# Patient Record
Sex: FEMALE | Race: BLACK OR AFRICAN AMERICAN | NOT HISPANIC OR LATINO | Employment: OTHER | URBAN - METROPOLITAN AREA
[De-identification: names, ages, dates, MRNs, and addresses within clinical notes are randomized per-mention and may not be internally consistent; named-entity substitution may affect disease eponyms.]

---

## 2017-01-13 RX ORDER — FUROSEMIDE 20 MG/1
TABLET ORAL
Qty: 30 TABLET | Refills: 6 | Status: ON HOLD | OUTPATIENT
Start: 2017-01-13 | End: 2017-03-01 | Stop reason: HOSPADM

## 2017-01-25 ENCOUNTER — OFFICE VISIT (OUTPATIENT)
Dept: PODIATRY | Facility: CLINIC | Age: 82
End: 2017-01-25
Payer: MEDICARE

## 2017-01-25 VITALS — WEIGHT: 154.13 LBS | HEIGHT: 61 IN | BODY MASS INDEX: 29.1 KG/M2

## 2017-01-25 DIAGNOSIS — B35.1 DERMATOPHYTOSIS OF NAIL: Primary | ICD-10-CM

## 2017-01-25 PROCEDURE — 99999 PR PBB SHADOW E&M-EST. PATIENT-LVL III: CPT | Mod: PBBFAC,,, | Performed by: PODIATRIST

## 2017-01-25 PROCEDURE — 17999 UNLISTD PX SKN MUC MEMB SUBQ: CPT | Mod: CSM,,, | Performed by: PODIATRIST

## 2017-01-25 PROCEDURE — 99499 UNLISTED E&M SERVICE: CPT | Mod: CSM,,, | Performed by: PODIATRIST

## 2017-01-25 NOTE — MR AVS SNAPSHOT
[facility] - Podiatry  [address] [facility] [place] [address]  Phone: [phone]  Fax: [phone]                  Radha Morales   2017 9:40 AM   Office Visit    Description:  Female : 1931   Provider:  Lilly Butts DPM   Department:  [facility] - Podiatry           Reason for Visit     Routine Foot Care                To Do List           Future Appointments        Provider Department Dept Phone    2/15/2017 9:20 AM Ashleigh Herrera MD [facility] - Internal Medicine [phone]    2017 9:00 AM Lilly Butts DPM [facility] Podiatry [phone]      Goals (5 Years of Data)     None      [facility]     [facility] -  Assistance  Registered nurses in the [facility] Call Center provide clinical advisement, health education, appointment booking, and other advisory services.  Call for this free service at [phone].             Medications           Message regarding Medications     Verify the changes and/or additions to your medication regime listed below are the same as discussed with your clinician today.  If any of these changes or additions are incorrect, please notify your healthcare provider.             Verify that the below list of medications is an accurate representation of the medications you are currently taking.  If none reported, the list may be blank. If incorrect, please contact your healthcare provider. Carry this list with you in case of emergency.           Current Medications     aspirin (ASPIRIN CHILDRENS) 81 MG Chew Take by mouth. 1 Tablet, Chewable Oral Every day    cholecalciferol, vitamin D3, 2,000 unit Cap Take 1 capsule (2,000 Units total) by mouth once daily.    fluticasone (FLONASE) 50 mcg/actuation nasal spray 1 spray by Each Nare route once daily.    furosemide (LASIX) 20 MG tablet TAKE 1 TABLET BY MOUTH ONCE A DAY    memantine (NAMENDA) 10 MG Tab TAKE ONE TABLET BY MOUTH TWICE DAILY    metoprolol tartrate (LOPRESSOR) 50 MG tablet  "TAKE ONE TABLET BY MOUTH TWICE DAILY    quetiapine (SEROQUEL) 50 MG tablet TAKE 1 OR 2 TABLETS BY MOUTH AT BEDTIME           Clinical Reference Information           Vital Signs - Last Recorded  Most recent update: 1/25/2017 10:30 AM by Teresa Corea MA    Ht Wt BMI          5' 1" (1.549 m) 69.9 kg (154 lb 1.6 oz) 29.12 kg/m2        Allergies as of 1/25/2017     Penicillins    Propoxyphene N-acetaminophen      Immunizations Administered on Date of Encounter - 1/25/2017     None      [facility] Sign-Up     Activating your [facility] account is as easy as 1-2-3!     1) Visit [email or web address], select Sign Up Now, enter this activation code and your date of birth, then select Next.  [email or web address]  Expires: 3/11/2017 10:52 AM      2) Create a username and password to use when you visit [facility] in the future and select a security question in case you lose your password and select Next.    3) Enter your e-mail address and click Sign Up!    Additional Information  If you have questions, please e-mail [email or web address] or call [phone] to talk to our [facility] staff. Remember, [facility] is NOT to be used for urgent needs. For medical emergencies, dial 911.         "

## 2017-01-25 NOTE — PROGRESS NOTES
PODIATRY NOTE    CHIEF COMPLAINT   Non-medical covered nail care    HPI:    Radha Morales is a 85 y.o. female presenting to podiatry clinic with complaint of progressive thickening and discoloration of the nails. The patient relates they are unable to trim their toenails due to thickness and difficulty. Toenails are painful and aggravated in shoewear.    LOWER EXTREMITY  Dermatologic:   · Nails 1-5 bilateral thickened, elongated, dystrophic, with subungual debris      ASSESSMENT   1. Dermatophytosis     PLAN    1. With patient's permission, nails were aggressively reduced and debrided x 10 to their soft tissue attachment mechanically and with electric , removing all offending nail and debris. Patient relates relief following the procedure.   -I advised the patient that for routine nail care or callus coverage is not covered under their insurance therefore we will enroll the patient in PROC B cosmetic foot care. The patient understands that they will be fully responsible for the bill as cosmetic foot care is not a covered service for their insurance.   3. RTC PRN    Report Electronically Signed By:  Lilly Butts DPM   Podiatric Medicine & Surgery  [facility]  1/25/2017

## 2017-02-15 ENCOUNTER — TELEPHONE (OUTPATIENT)
Dept: INTERNAL MEDICINE | Facility: CLINIC | Age: 82
End: 2017-02-15

## 2017-02-15 NOTE — TELEPHONE ENCOUNTER
----- Message from Catalina Hunter sent at 2/15/2017  3:20 PM CST -----  Pt is requesting a call from nurse in regards to r/s her appt. Pt was offered a mid level             Please call pt back at [phone]

## 2017-02-26 ENCOUNTER — HOSPITAL ENCOUNTER (INPATIENT)
Facility: HOSPITAL | Age: 82
LOS: 3 days | Discharge: HOME HEALTH | DRG: 872 | End: 2017-03-01
Attending: EMERGENCY MEDICINE | Admitting: INTERNAL MEDICINE
Payer: MEDICARE

## 2017-02-26 DIAGNOSIS — A41.9 SEPSIS: ICD-10-CM

## 2017-02-26 DIAGNOSIS — M15.9 PRIMARY OSTEOARTHRITIS INVOLVING MULTIPLE JOINTS: ICD-10-CM

## 2017-02-26 DIAGNOSIS — E78.00 PURE HYPERCHOLESTEROLEMIA: ICD-10-CM

## 2017-02-26 DIAGNOSIS — T17.908S ASPIRATION INTO AIRWAY, SEQUELA: ICD-10-CM

## 2017-02-26 DIAGNOSIS — I77.9 BILATERAL CAROTID ARTERY DISEASE: ICD-10-CM

## 2017-02-26 DIAGNOSIS — T17.800A ASPIRATION INTO LOWER RESPIRATORY TRACT, INITIAL ENCOUNTER: ICD-10-CM

## 2017-02-26 DIAGNOSIS — I47.10 SVT (SUPRAVENTRICULAR TACHYCARDIA): ICD-10-CM

## 2017-02-26 DIAGNOSIS — I25.10 CORONARY ARTERY DISEASE DUE TO CALCIFIED CORONARY LESION: ICD-10-CM

## 2017-02-26 DIAGNOSIS — I70.0 CALCIFICATION OF AORTA: ICD-10-CM

## 2017-02-26 DIAGNOSIS — I51.89 LEFT VENTRICULAR DIASTOLIC DYSFUNCTION WITH PRESERVED SYSTOLIC FUNCTION: ICD-10-CM

## 2017-02-26 DIAGNOSIS — G30.1 LATE ONSET ALZHEIMER'S DISEASE WITHOUT BEHAVIORAL DISTURBANCE: ICD-10-CM

## 2017-02-26 DIAGNOSIS — I25.84 CORONARY ARTERY DISEASE DUE TO CALCIFIED CORONARY LESION: ICD-10-CM

## 2017-02-26 DIAGNOSIS — R00.0 TACHYCARDIA: Primary | ICD-10-CM

## 2017-02-26 DIAGNOSIS — R50.9 FEVER: ICD-10-CM

## 2017-02-26 DIAGNOSIS — I10 ESSENTIAL HYPERTENSION: Chronic | ICD-10-CM

## 2017-02-26 DIAGNOSIS — F02.80 LATE ONSET ALZHEIMER'S DISEASE WITHOUT BEHAVIORAL DISTURBANCE: ICD-10-CM

## 2017-02-26 PROBLEM — T17.908A ASPIRATION INTO AIRWAY: Status: ACTIVE | Noted: 2017-02-26

## 2017-02-26 LAB
ALBUMIN SERPL BCP-MCNC: 3.5 G/DL
ALP SERPL-CCNC: 99 U/L
ALT SERPL W/O P-5'-P-CCNC: 23 U/L
ANION GAP SERPL CALC-SCNC: 10 MMOL/L
APTT BLDCRRT: 27.3 SEC
AST SERPL-CCNC: 28 U/L
BACTERIA #/AREA URNS HPF: NORMAL /HPF
BASOPHILS # BLD AUTO: 0.02 K/UL
BASOPHILS NFR BLD: 0.2 %
BILIRUB SERPL-MCNC: 1.3 MG/DL
BILIRUB UR QL STRIP: NEGATIVE
BNP SERPL-MCNC: 169 PG/ML
BUN SERPL-MCNC: 8 MG/DL
CALCIUM SERPL-MCNC: 9.5 MG/DL
CHLORIDE SERPL-SCNC: 99 MMOL/L
CLARITY UR: CLEAR
CO2 SERPL-SCNC: 27 MMOL/L
COLOR UR: YELLOW
CREAT SERPL-MCNC: 0.7 MG/DL
DIFFERENTIAL METHOD: ABNORMAL
EOSINOPHIL # BLD AUTO: 0.1 K/UL
EOSINOPHIL NFR BLD: 0.5 %
ERYTHROCYTE [DISTWIDTH] IN BLOOD BY AUTOMATED COUNT: 13.6 %
EST. GFR  (AFRICAN AMERICAN): >60 ML/MIN/1.73 M^2
EST. GFR  (NON AFRICAN AMERICAN): >60 ML/MIN/1.73 M^2
FLUAV AG SPEC QL IA: NEGATIVE
FLUBV AG SPEC QL IA: NEGATIVE
GLUCOSE SERPL-MCNC: 139 MG/DL
GLUCOSE UR QL STRIP: ABNORMAL
HCT VFR BLD AUTO: 34.9 %
HGB BLD-MCNC: 12 G/DL
HGB UR QL STRIP: ABNORMAL
INR PPP: 1.1
KETONES UR QL STRIP: NEGATIVE
LACTATE SERPL-SCNC: 2 MMOL/L
LEUKOCYTE ESTERASE UR QL STRIP: NEGATIVE
LYMPHOCYTES # BLD AUTO: 1.1 K/UL
LYMPHOCYTES NFR BLD: 10.6 %
MCH RBC QN AUTO: 26.8 PG
MCHC RBC AUTO-ENTMCNC: 34.4 %
MCV RBC AUTO: 78 FL
MICROSCOPIC COMMENT: NORMAL
MONOCYTES # BLD AUTO: 0.9 K/UL
MONOCYTES NFR BLD: 8.8 %
NEUTROPHILS # BLD AUTO: 8.4 K/UL
NEUTROPHILS NFR BLD: 80.1 %
NITRITE UR QL STRIP: NEGATIVE
PH UR STRIP: 7 [PH] (ref 5–8)
PLATELET # BLD AUTO: 131 K/UL
PMV BLD AUTO: 11.5 FL
POTASSIUM SERPL-SCNC: 3 MMOL/L
PROT SERPL-MCNC: 7.9 G/DL
PROT UR QL STRIP: ABNORMAL
PROTHROMBIN TIME: 11.4 SEC
RBC # BLD AUTO: 4.47 M/UL
RBC #/AREA URNS HPF: 4 /HPF (ref 0–4)
SODIUM SERPL-SCNC: 136 MMOL/L
SP GR UR STRIP: 1.01 (ref 1–1.03)
SPECIMEN SOURCE: NORMAL
SQUAMOUS #/AREA URNS HPF: 1 /HPF
URN SPEC COLLECT METH UR: ABNORMAL
UROBILINOGEN UR STRIP-ACNC: 1 EU/DL
WBC # BLD AUTO: 10.45 K/UL
WBC #/AREA URNS HPF: 1 /HPF (ref 0–5)

## 2017-02-26 PROCEDURE — 99285 EMERGENCY DEPT VISIT HI MDM: CPT | Mod: 25

## 2017-02-26 PROCEDURE — 63600175 PHARM REV CODE 636 W HCPCS: Performed by: EMERGENCY MEDICINE

## 2017-02-26 PROCEDURE — P9612 CATHETERIZE FOR URINE SPEC: HCPCS

## 2017-02-26 PROCEDURE — 87040 BLOOD CULTURE FOR BACTERIA: CPT

## 2017-02-26 PROCEDURE — 25500020 PHARM REV CODE 255: Performed by: EMERGENCY MEDICINE

## 2017-02-26 PROCEDURE — 25000003 PHARM REV CODE 250: Performed by: INTERNAL MEDICINE

## 2017-02-26 PROCEDURE — 80053 COMPREHEN METABOLIC PANEL: CPT

## 2017-02-26 PROCEDURE — 85025 COMPLETE CBC W/AUTO DIFF WBC: CPT

## 2017-02-26 PROCEDURE — 21400001 HC TELEMETRY ROOM

## 2017-02-26 PROCEDURE — 93005 ELECTROCARDIOGRAM TRACING: CPT

## 2017-02-26 PROCEDURE — 85610 PROTHROMBIN TIME: CPT

## 2017-02-26 PROCEDURE — 96375 TX/PRO/DX INJ NEW DRUG ADDON: CPT

## 2017-02-26 PROCEDURE — 85730 THROMBOPLASTIN TIME PARTIAL: CPT

## 2017-02-26 PROCEDURE — 83880 ASSAY OF NATRIURETIC PEPTIDE: CPT

## 2017-02-26 PROCEDURE — 83605 ASSAY OF LACTIC ACID: CPT

## 2017-02-26 PROCEDURE — 87400 INFLUENZA A/B EACH AG IA: CPT | Mod: 59

## 2017-02-26 PROCEDURE — 93010 ELECTROCARDIOGRAM REPORT: CPT | Mod: ,,, | Performed by: INTERNAL MEDICINE

## 2017-02-26 PROCEDURE — 25000003 PHARM REV CODE 250: Performed by: EMERGENCY MEDICINE

## 2017-02-26 PROCEDURE — 96365 THER/PROPH/DIAG IV INF INIT: CPT

## 2017-02-26 PROCEDURE — 81000 URINALYSIS NONAUTO W/SCOPE: CPT

## 2017-02-26 RX ORDER — CEFEPIME HYDROCHLORIDE 1 G/50ML
1 INJECTION, SOLUTION INTRAVENOUS
Status: DISCONTINUED | OUTPATIENT
Start: 2017-02-26 | End: 2017-02-26

## 2017-02-26 RX ORDER — POTASSIUM CHLORIDE 20 MEQ/15ML
60 SOLUTION ORAL
Status: COMPLETED | OUTPATIENT
Start: 2017-02-26 | End: 2017-02-26

## 2017-02-26 RX ORDER — POTASSIUM CHLORIDE 20 MEQ/1
60 TABLET, EXTENDED RELEASE ORAL
Status: DISCONTINUED | OUTPATIENT
Start: 2017-02-26 | End: 2017-02-26

## 2017-02-26 RX ORDER — LABETALOL HYDROCHLORIDE 5 MG/ML
10 INJECTION, SOLUTION INTRAVENOUS EVERY 4 HOURS PRN
Status: DISCONTINUED | OUTPATIENT
Start: 2017-02-27 | End: 2017-03-01 | Stop reason: HOSPADM

## 2017-02-26 RX ORDER — ACETAMINOPHEN 650 MG/1
650 SUPPOSITORY RECTAL
Status: COMPLETED | OUTPATIENT
Start: 2017-02-26 | End: 2017-02-26

## 2017-02-26 RX ORDER — LABETALOL HYDROCHLORIDE 5 MG/ML
20 INJECTION, SOLUTION INTRAVENOUS
Status: COMPLETED | OUTPATIENT
Start: 2017-02-26 | End: 2017-02-26

## 2017-02-26 RX ADMIN — LABETALOL HYDROCHLORIDE 20 MG: 5 INJECTION, SOLUTION INTRAVENOUS at 11:02

## 2017-02-26 RX ADMIN — ACETAMINOPHEN 650 MG: 650 SUPPOSITORY RECTAL at 10:02

## 2017-02-26 RX ADMIN — POTASSIUM CHLORIDE 60 MEQ: 20 SOLUTION ORAL at 10:02

## 2017-02-26 RX ADMIN — VANCOMYCIN HYDROCHLORIDE 1250 MG: 1 INJECTION, POWDER, LYOPHILIZED, FOR SOLUTION INTRAVENOUS at 11:02

## 2017-02-26 RX ADMIN — IOHEXOL 75 ML: 350 INJECTION, SOLUTION INTRAVENOUS at 10:02

## 2017-02-26 RX ADMIN — SODIUM CHLORIDE 2097 ML: 0.9 INJECTION, SOLUTION INTRAVENOUS at 11:02

## 2017-02-26 NOTE — IP AVS SNAPSHOT
[address] [place] [address]           Patient Discharge Instructions     Our goal is to set you up for success. This packet includes information on your condition, medications, and your home care. It will help you to care for yourself so you don't get sicker and need to go back to the hospital.     Please ask your nurse if you have any questions.        There are many details to remember when preparing to leave the hospital. Here is what you will need to do:    1. Take your medicine. If you are prescribed medications, review your Medication List in the following pages. You may have new medications to  at the pharmacy and others that you'll need to stop taking. Review the instructions for how and when to take your medications. Talk with your doctor or nurses if you are unsure of what to do.     2. Go to your follow-up appointments. Specific follow-up information is listed in the following pages. Your may be contacted by a transition nurse or clinical provider about future appointments. Be sure we have all of the phone numbers to reach you, if needed. Please contact your provider's office if you are unable to make an appointment.     3. Watch for warning signs. Your doctor or nurse will give you detailed warning signs to watch for and when to call for assistance. These instructions may also include educational information about your condition. If you experience any of warning signs to your health, call your doctor.               ** Verify the list of medication(s) below is accurate and up to date. Carry this with you in case of emergency. If your medications have changed, please notify your healthcare provider.             Medication List      START taking these medications        Additional Info                      levoFLOXacin 500 MG tablet   Commonly known as:  LEVAQUIN   Quantity:  7 tablet   Refills:  0   Dose:  500 mg    Instructions:  Take 1 tablet  (500 mg total) by mouth once daily.     Begin Date    AM    Noon    PM    Bedtime         CONTINUE taking these medications        Additional Info                      ASPIRIN CHILDRENS 81 MG Chew   Refills:  0   Generic drug:  aspirin    Instructions:  Take by mouth. 1 Tablet, Chewable Oral Every day     Begin Date    AM    Noon    PM    Bedtime       cholecalciferol (vitamin D3) 2,000 unit Cap   Quantity:  100 capsule   Refills:  6   Dose:  1 capsule    Instructions:  Take 1 capsule (2,000 Units total) by mouth once daily.     Begin Date    AM    Noon    PM    Bedtime       fluticasone 50 mcg/actuation nasal spray   Commonly known as:  FLONASE   Quantity:  1 Bottle   Refills:  0   Dose:  1 spray    Last time this was given:  1 spray on 3/1/2017  9:00 AM   Instructions:  1 spray by Each Nare route once daily.     Begin Date    AM    Noon    PM    Bedtime       memantine 10 MG Tab   Commonly known as:  NAMENDA   Quantity:  60 tablet   Refills:  9   Comments:  This prescription was filled today. Any refills authorized will be placed on file.    Last time this was given:  10 mg on 3/1/2017  9:13 AM   Instructions:  TAKE ONE TABLET BY MOUTH TWICE DAILY     Begin Date    AM    Noon    PM    Bedtime       metoprolol tartrate 50 MG tablet   Commonly known as:  LOPRESSOR   Quantity:  60 tablet   Refills:  5   Comments:  This prescription was filled today(10/14/2016). Any refills authorized will be placed on file.    Last time this was given:  50 mg on 3/1/2017  9:13 AM   Instructions:  TAKE ONE TABLET BY MOUTH TWICE DAILY     Begin Date    AM    Noon    PM    Bedtime       quetiapine 50 MG tablet   Commonly known as:  SEROQUEL   Quantity:  60 tablet   Refills:  6   Comments:  This prescription was filled today. Any refills authorized will be placed on file.    Last time this was given:  50 mg on 2/28/2017  8:42 PM   Instructions:  TAKE 1 OR 2 TABLETS BY MOUTH AT BEDTIME     Begin Date    AM    Noon    PM    Bedtime          STOP taking these medications     furosemide 20 MG tablet   Commonly known as:  LASIX            Where to Get Your Medications      These medications were sent to [facility] - [facility] [place], [place] - [address]  [address] [facility], [place] [address]     Phone:  [phone]     levoFLOXacin 500 MG tablet                  Please bring to all follow up appointments:    1. A copy of your discharge instructions.  2. All medicines you are currently taking in their original bottles.  3. Identification and insurance card.    Please arrive 15 minutes ahead of scheduled appointment time.    Please call 24 hours in advance if you must reschedule your appointment and/or time.        Your Scheduled Appointments     Mar 09, 2017  9:00 AM CST   Established Patient Visit with SOLEDAD Payne   [facility]    [address] [place] [address] [phone]            Apr 26, 2017 10:00 AM CDT   Established Patient Visit with Lilly Butts DPM   [facility] - Podiatry ([facility])    [address] [place] [address] [phone]              Follow-up Information     Follow up with [facility].    Specialty:  Home Health Services    Why:  Home Health    Contact information:    [address]  [place] [address]  [phone]          Follow up with Ashleigh Herrera MD In 1 week.    Specialty:  Internal Medicine    Contact information:    [address] [place] [address] [phone]          Follow up with SOLEDAD Honeycutt. Go on 3/9/2017.    Specialty:  Internal Medicine    Why:  @ 9am for your hospital follow up appointment with Dr Herrera' NP , Outpatient Services    Contact information:    [address] [address] [place] [address]  [phone]        Referrals     Future Orders    Referral to Home health         Discharge Instructions     Future Orders    Diet general     Questions:    Total calories:      Fat restriction, if  "any:      Protein restriction, if any:      Na restriction, if any:      Fluid restriction:      Additional restrictions:      WHEELCHAIR FOR HOME USE     Questions:    Hours in W/C per day:  16    Type of Wheelchair:  Standard    Size(Width):  18"(STD adult)    Leg Support:  STD footrests    Arm Height:      Desired seat depth:      Back height:      Lower leg length:      Actual seat depth:      Lap Belt:  Velcro    Accessories:      Cushion:  Foam    Justification for cushion:      Height:  5' 2" (1.575 m)    Weight:  69.9 kg (154 lb)    Does patient have medical equipment at home?:  cane, straight    shower chair    Length of need (1-99 months):  99    Please check all that apply:  Caregiver is capable and willing to operate wheelchair safely.    The patient requires the use of a w/c for activities of daily living within the Home.    Patient mobility limitations cannot be sufficiently resolved by the use of other ambulatory therapies.        Primary Diagnosis     Your primary diagnosis was:  Infection In Bloodstream      Admission Information     Date & Time Provider Department CSN    2/26/2017  7:09 PM Nicole Crenshaw MD [facility] -  [id number]      Care Providers     Provider Role Specialty Primary office phone    Nicole Crenshaw MD Attending Provider Internal Medicine [phone]    Nicole Crenshaw MD Team Attending  Internal Medicine [phone]      Your Vitals Were     BP                   132/60 (BP Location: Left arm, Patient Position: Lying, BP Method: Automatic)           Recent Lab Values        9/6/2012 4/5/2013 2/27/2017                    10:03 AM  8:32 AM  4:03 AM         A1C 5.9 5.8 4.9         Comment for A1C at  4:03 AM on 2/27/2017:  According to ADA guidelines, hemoglobin A1C <7.0% represents  optimal control in non-pregnant diabetic patients.  Different  metrics may apply to specific populations.   Standards of Medical Care in Diabetes - 2016.  For the purpose of " screening for the presence of diabetes:  <5.7%     Consistent with the absence of diabetes  5.7-6.4%  Consistent with increasing risk for diabetes   (prediabetes)  >or=6.5%  Consistent with diabetes  Currently no consensus exists for use of hemoglobin A1C  for diagnosis of diabetes for children.        Pending Labs     Order Current Status    Blood Culture #2 **CANNOT BE ORDERED STAT** Preliminary result    Blood culture Preliminary result      Allergies as of 3/1/2017        Reactions    Penicillins     Other reaction(s): Unknown    Propoxyphene N-acetaminophen     Other reaction(s): Rash      [facility]     [facility] Nurse Care Line - 24/7 Assistance  Unless otherwise directed by your provider, please contact [facility], our nurse care line that is available for 24/7 assistance.     Registered nurses in the [facility] provide clinical advisement, health education, appointment booking, and other advisory services.  Call for this free service at [phone].        Advance Directives     An advance directive is a document which, in the event you are no longer able to make decisions for yourself, tells your healthcare team what kind of treatment you do or do not want to receive, or who you would like to make those decisions for you.  If you do not currently have an advance directive, [facility] encourages you to create one.  For more information call:  [phone]), [phone] ([phone]),  or log on to [email or web address].        Language Assistance Services     ATTENTION: Language assistance services are available, free of charge. Please call [phone].      ATENCIÓN: Si habla español, tiene a castro disposición servicios gratuitos de asistencia lingüística. Llame al [phone].     CHÚ Ý: N?u b?n nói Ti?ng Vi?t, có các d?ch v? h? tr? ngôn ng? mi?n phí dành cho b?n. G?i s? [phone].        Heart Failure Education       Heart Failure: Being Active  You  have a condition called heart failure. Being active doesnt mean that you have to wear yourself out. Even a little movement each day helps to strengthen your heart. If you cant get out to exercise, you can do simple stretching and strengthening exercises at home. These are good ways to keep you well-conditioned and prevent you and your heart from becoming excessively weak.    Ideas to get you started  · Add a little movement to things you do now. Walk to mail letters. Park your car at the far end of the parking lot and walk to the store. Walk up a flight of stairs instead of taking the elevator.  · Choose activities you enjoy. You might walk, swim, or ride an exercise bike. Things like gardening and washing the car count, too. Other possibilities include: washing dishes, walking the dog, walking around the mall, and doing aerobic activities with friends.  · Join a group exercise program at a [facility], a senior center, or a community center. Or look into a hospital cardiac rehabilitation program. Ask your doctor if you qualify.  Tips to keep you going  · Get up and get dressed each day. Go to a coffee shop and read a newspaper or go somewhere that you'll be in the presence of other active people. Youll feel more like being active.  · Make a plan. Choose one or more activities that you enjoy and that you can easily do. Then plan to do at least one each day. You might write your plan on a calendar.  · Go with a friend or a group if you like company. This can help you feel supported and stay motivated, too.  · Plan social events that you enjoy. This will keep you mentally engaged as well as physically motivated to do things you find pleasure in.  For your safety  · Talk with your healthcare provider before starting an exercise program.  · Exercise indoors when its too hot or too cold outside, or when the air quality is poor. Try walking at a shopping mall.  · Wear socks and sturdy shoes to maintain your balance  and prevent falls.  · Start slowly. Do a few minutes several times a day at first. Increase your time and speed little by little.  · Stop and rest whenever you feel tired or get short of breath.  · Dont push yourself on days when you dont feel well.  Date Last Reviewed: 3/20/2016  © 5429-9547 [employer]. [address] [place] [address], [place] [address]. All rights reserved. This information is not intended as a substitute for professional medical care. Always follow your healthcare professional's instructions.              Heart Failure: Evaluating Your Heart  You have a condition called heart failure. To evaluate your condition, your doctor will examine you, ask questions, and do some tests. Along with looking for signs of heart failure, the doctor looks for any other health problems that may have led to heart failure. The results of your evaluation will help your doctor form a treatment plan.  Health history and physical exam  Your visit will start with a health history. Tell the doctor about any symptoms youve noticed and about all medicines you take. Then youll have a physical exam. This includes listening to your heartbeat and breathing. Youll also be checked for swelling (edema) in your legs and neck. When you have fluid buildup or fluid in the lungs, it may be called congestive heart failure.  Diagnosing heart failure     During an echocardiogram, sound waves bounce off the heart. These are converted into a picture on the screen.   The following may be done to help your doctor form a diagnosis:  · X-rays show the size and shape of your heart. These pictures can also show fluid in your lungs.  · An electrocardiogram (ECG or EKG) shows the pattern of your heartbeat. Small pads (electrodes) are placed on your chest, arms, and legs. Wires connect the pads to the ECG machine, which records your hearts electrical signals. This can give the doctor information about heart function.  · An  echocardiogram uses ultrasound waves to show the structure and movement of your heart muscle. This shows how well the heart pumps. It also shows the thickness of the heart walls, and if the heart is enlarged. It is one of the most useful, non-invasive tests as it provides information about the heart's general function. This helps your doctor make treatment decisions.  · Lab tests evaluate small amounts of blood or urine for signs of problems. A BNP lab test can help diagnose and evaluate heart failure. BNP stands for B-type natriuretic peptide. The ventricles secrete more BNP when heart failure worsens. Lab tests can also provide information about metabolic dysfunction or heart dysfunction.  Your treatment plan  Based on the results of your evaluation and tests, your doctor will develop a treatment plan. This plan is designed to relieve some of your heart failure symptoms and help make you more comfortable. Your treatment plan may include:  · Medicine to help your heart work better and improve your quality of life  · Changes in what you eat and drink to help prevent fluid from backing up in your body  · Daily monitoring of your weight and heart failure symptoms to see how well your treatment plan is working  · Exercise to help you stay healthy  · Help with quitting smoking  · Emotional and psychological support to help adjust to the changes  · Referrals to other specialists to make sure you are being treated comprehensively  Date Last Reviewed: 3/21/2016  © 0370-4406 [employer], "[employer]. [address] [place] [address], [place] [address]. All rights reserved. This information is not intended as a substitute for professional medical care. Always follow your healthcare professional's instructions.              Heart Failure: Making Changes to Your Diet  You have a condition called heart failure. When you have heart failure, excess fluid is more likely to build up in your body because your heart isn't working well. This  makes the heart work harder to pump blood. Fluid buildup causes symptoms such as shortness of breath and swelling (edema). This is often referred to as congestive heart failure or CHF. Controlling the amount of salt (sodium) you eat may help stop fluid from building up. Your doctor may also tell you to reduce the amount of fluid you drink.  Reading food labels    Your healthcare provider will tell you how much sodium you can eat each day. Read food labels to keep track. Keep in mind that certain foods are high in salt. These include canned, frozen, and processed foods. Check the amount of sodium in each serving. Watch out for high-sodium ingredients. These include MSG (monosodium glutamate), baking soda, and sodium phosphate.   Eating less salt  Give yourself time to get used to eating less salt. It may take a little while. Here are some tips to help:  · Take the saltshaker off the table. Replace it with salt-free herb mixes and spices.  · Eat fresh or plain frozen vegetables. These have much less salt than canned vegetables.  · Choose low-sodium snacks like sodium-free pretzels, crackers, or air-popped popcorn.  · Dont add salt to your food when youre cooking. Instead, season your foods with pepper, lemon, garlic, or onion.  · When you eat out, ask that your food be cooked without added salt.  · Avoid eating fried foods as these often have a great deal of salt.  If youre told to limit fluids  You may need to limit how much fluid you have to help prevent swelling. This includes anything that is liquid at room temperature, such as ice cream and soup. If your doctor tells you to limit fluid, try these tips:  · Measure drinks in a measuring cup before you drink them. This will help you meet daily goals.  · Chill drinks to make them more refreshing.  · Suck on frozen lemon wedges to quench thirst.  · Only drink when youre thirsty.  · Chew sugarless gum or suck on hard candy to keep your mouth moist.  · Weigh  yourself daily to know if your body's fluid content is rising.  My sodium goal  Your healthcare provider may give you a sodium goal to meet each day. This includes sodium found in food as well as salt that you add. My goal is to eat no more than ___________ mg of sodium per day.     When to call your doctor  Call your doctor right away if you have any symptoms of worsening heart failure. These can include:  · Sudden weight gain  · Increased swelling of your legs or ankles  · Trouble breathing when youre resting or at night  · Increase in the number of pillows you have to sleep on  · Chest pain, pressure, discomfort, or pain in the jaw, neck, or back   Date Last Reviewed: 3/21/2016  © 0230-5176 [employer]. [address] [place] [address], [place] [address]. All rights reserved. This information is not intended as a substitute for professional medical care. Always follow your healthcare professional's instructions.              Heart Failure: Medicines to Help Your Heart    You have a condition called heart failure (also known as congestive heart failure, or CHF). Your doctor will likely prescribe medicines for heart failure and any underlying health problems you have. Most heart failure patients take one or more types of medicinen. Your healthcare provider will work to find the combination of medicines that works best for you.  Heart failure medicines  Here are the most common heart failure medicines:  · ACE inhibitors lower blood pressure and decrease strain on the heart. This makes it easier for the heart to pump. Angiotensin receptor blockers have similar effects. These are prescribed for some patients instead of ACE inhibitors.  · Beta-blockers relieve stress on the heart. They also improve symptoms. They may also improve the heart's pumping action over time.  · Diuretics (also called water pills) help rid your body of excess water. This can help rid your body of swelling (edema). Having less fluid to  pump means your heart doesnt have to work as hard. Some diuretics make your body lose a mineral called potassium. Your doctor will tell you if you need to take supplements or eat more foods high in potassium.  · Digoxin helps your heart pump with more strength. This helps your heart pump more blood with each beat. So, more oxygen-rich blood travels to the rest of the body.  · Aldosterone antagonists help alter hormones and decrease strain on the heart.  · Hydralazine and nitrates are two separate medicines used together to treat heart failure. They may come in one combination pill. They lower blood pressure and decrease how hard the heart has to pump.  Medicines for related conditions  Controlling other heart problems helps keep heart failure under control, too. Depending on other heart problems you have, medicines may be prescribed to:  · Lower blood pressure (antihypertensives).  · Lower cholesterol levels (statins).  · Prevent blood clots (anticoagulants or aspirin).  · Keep the heartbeat steady (antiarrhythmics).  Date Last Reviewed: 3/5/2016  © 0830-4211 [employer]. [address] [place] [address]. All rights reserved. This information is not intended as a substitute for professional medical care. Always follow your healthcare professional's instructions.              Heart Failure: Procedures That May Help    The heart is a muscle that pumps oxygen-rich blood to all parts of the body. When you have heart failure, the heart is not able to pump as well as it should. Blood and fluid may back up into the lungs (congestive heart failure), and some parts of the body dont get enough oxygen-rich blood to work normally. These problems lead to the symptoms of heart failure.     Certain procedures may help the heart pump better in some cases of heart failure. Some procedures are done to treat health problems that may have caused the heart failure such as coronary artery disease or heart  rhythm problems. For more serious heart failure, other options are available.  Treating artery and valve problems  If you have coronary artery disease or valve disease, procedures may be done to improve blood flow. This helps the heart pump better, which can improve heart failure symptoms. First, your doctor may do a cardiac catheterization to help detect clogged blood vessels or valve damage. During this procedure, a  thin tube (catheter) in inserted into a blood vessel and guided to the heart. There a dye is injected and a special type of X-ray (angiogram) is taken of the blood vessels. Procedures to open a blocked artery or fix damaged valves can also be done using catheterization.  · Angioplasty uses a balloon-tipped instrument at the end of the catheter. The balloon is inflated to widen the narrowed artery. In many cases, a stent is expanded to further support the narrowed artery. A stent is a metal mesh tube.  · Valve surgery repairs or replacement of faulty valves can also be done during catheterization so blood can flow properly through the chambers of the heart.  Bypass surgery is another option to help treat blocked arteries. It uses a healthy blood vessel from elsewhere in the body. The healthy blood vessel is attached above and below the blocked area so that blood can flow around the blocked artery.  Treating heart rhythm problems  A device may be placed in the chest to help a weak heart maintain a healthy, heartbeat so the heart can pump more effectively:  · Pacemaker. A pacemaker is an implanted device that regulates your heartbeat electronically. It monitors your heart's rhythm and generates a painless electric impulse that helps the heart beat in a regular rhythm. A pacemaker is programmed to meet your specific heart rhythm needs.  · Biventricular pacing/cardiac resynchronization therapy. A type of pacemaker that paces both pumping chambers of the heart at the same time to coordinate contractions and  to improve the heart's function. Some people with heart failure are candidates for this therapy.  · Implantable cardioverter defibrillator. A device similar to a pacemaker that senses when the heart is beating too fast and delivers an electrical shock to convert the fast rhythm to a normal rhythm. This can be a life saving device.  In severe cases  In more serious cases of heart failure when other treatments no longer work, other options may include:  · Ventricular assist devices (VADs). These are mechanical devices used to take over the pumping function for one or both of the heart's ventricles, or pumping chambers. A VAD may be necessary when heart failure progresses to the point that medicines and other treatments no longer help. In some cases, a VAD may be used as a bridge to transplant.  · Heart transplant. This is replacing the diseased heart with a healthy one from a donor. This is an option for a few people who are very sick. A heart transplant is very serious and not an option for all patients. Your doctor can tell you more.  Date Last Reviewed: 3/20/2016  © 4747-4483 [employer]. [address] [place] [address]. All rights reserved. This information is not intended as a substitute for professional medical care. Always follow your healthcare professional's instructions.              Heart Failure: Tracking Your Weight  You have a condition called heart failure. When you have heart failure, a sudden weight gain or a steady rise in weight is a warning sign that your body is retaining too much water and salt. This could mean your heart failure is getting worse. If left untreated, it can cause problems for your lungs and result in shortness of breath. Weighing yourself each day is the best way to know if youre retaining water. If your weight goes up quickly, call your doctor. You will be given instructions on how to get rid of the excess water. You will likely need medicines and to  avoid salt. This will help your heart work better.  Call your doctor if you gain more than 2 pounds in 1 day, more than 5 pounds in 1 week, or whatever weight gain you were told to report by your doctor. This is often a sign of worsening heart failure and needs to be evaluated and treated. Your doctor will tell you what to do next.   Tips for weighing yourself    · Weigh yourself at the same time each morning, wearing the same clothes. Weigh yourself after urinating and before eating.  · Use the same scale each day. Make sure the numbers are easy to read. Put the scale on a flat, hard surface -- not on a rug or carpet.  · Do not stop weighing yourself. If you forget one day, weigh again the next morning.  How to use your weight chart  · Keep your weight chart near the scale. Write your weight on the chart as soon as you get off the scale.  · Fill in the month and the start date on the chart. Then write down your weight each day. Your chart will look like this:    · If you miss a day, leave the space blank. Weigh yourself the next day and write your weight in the next space.  · Take your weight chart with you when you go to see your doctor.  Date Last Reviewed: 3/20/2016  © 2852-1041 [employer]. [address] [place] [address], [place] [address]. All rights reserved. This information is not intended as a substitute for professional medical care. Always follow your healthcare professional's instructions.              Heart Failure: Warning Signs of a Flare-Up  You have a condition called heart failure. Once you have heart failure, flare-ups can happen. Below are signs that can mean your heart failure is getting worse. If you notice any of these warning signs, call your healthcare provider.  Swelling    · Your feet, ankles, or lower legs get puffier.  · You notice skin changes on your lower legs.  · Your shoes feel too tight.  · Your clothes are tighter in the waist.  · You have trouble getting rings on or off  your fingers.  Shortness of breath  · You have to breathe harder even when youre doing your normal activities or when youre resting.  · You are short of breath walking up stairs or even short distances.  · You wake up at night short of breath or coughing.  · You need to use more pillows or sit up to sleep.  · You wake up tired or restless.  Other warning signs  · You feel weaker, dizzy, or more tired.  · You have chest pain or changes in your heartbeat.  · You have a cough that wont go away.  · You cant remember things or dont feel like eating.  Tracking your weight  Gaining weight is often the first warning sign that heart failure is getting worse. Gaining even a few pounds can be a sign that your body is retaining excess water and salt. Weighing yourself each day in the morning after you urinate and before you eat, is the best way to know if you're retaining water. Get a scale that is easy to read and make sure you wear the same clothes and use the same scale every time you weigh. Your healthcare provider will show you how to track your weight. Call your doctor if you gain more than 2 pounds in 1 day, 5 pounds in 1 week, or whatever weight gain you were told to report by your doctor. This is often a sign of worsening heart failure and needs to be evaluated and treated before it compromises your breathing. Your doctor will tell you what to do next.    Date Last Reviewed: 3/15/2016  © 3920-2834 [employer]. [address] [place] [address], [place] [address]. All rights reserved. This information is not intended as a substitute for professional medical care. Always follow your healthcare professional's instructions.              [facility] Sign-Up     Activating your [facility] account is as easy as 1-2-3!     1) Visit [email or web address], select Sign Up Now, enter this activation code and your date of birth, then select Next.  [email or web address]  Expires: 3/11/2017 10:52 AM      2) Create a username and password  to use when you visit [facility] in the future and select a security question in case you lose your password and select Next.    3) Enter your e-mail address and click Sign Up!    Additional Information  If you have questions, please e-mail [email or web address] or call [phone] to talk to our [facility] staff. Remember, [facility] is NOT to be used for urgent needs. For medical emergencies, dial 911.          [facility] complies with applicable Federal civil rights laws and does not discriminate on the basis of race, color, national origin, age, disability, or sex.

## 2017-02-27 LAB
ALBUMIN SERPL BCP-MCNC: 3.1 G/DL
ALP SERPL-CCNC: 93 U/L
ALT SERPL W/O P-5'-P-CCNC: 21 U/L
ANION GAP SERPL CALC-SCNC: 9 MMOL/L
AST SERPL-CCNC: 26 U/L
BASOPHILS # BLD AUTO: 0.02 K/UL
BASOPHILS NFR BLD: 0.2 %
BILIRUB SERPL-MCNC: 1.3 MG/DL
BUN SERPL-MCNC: 7 MG/DL
CALCIUM SERPL-MCNC: 8.6 MG/DL
CHLORIDE SERPL-SCNC: 107 MMOL/L
CO2 SERPL-SCNC: 23 MMOL/L
CREAT SERPL-MCNC: 0.7 MG/DL
DIFFERENTIAL METHOD: ABNORMAL
EOSINOPHIL # BLD AUTO: 0.1 K/UL
EOSINOPHIL NFR BLD: 0.5 %
ERYTHROCYTE [DISTWIDTH] IN BLOOD BY AUTOMATED COUNT: 13.4 %
EST. GFR  (AFRICAN AMERICAN): >60 ML/MIN/1.73 M^2
EST. GFR  (NON AFRICAN AMERICAN): >60 ML/MIN/1.73 M^2
GIANT PLATELETS BLD QL SMEAR: PRESENT
GLUCOSE SERPL-MCNC: 151 MG/DL
HCT VFR BLD AUTO: 34.7 %
HGB BLD-MCNC: 11.8 G/DL
LYMPHOCYTES # BLD AUTO: 1.3 K/UL
LYMPHOCYTES NFR BLD: 12.5 %
MAGNESIUM SERPL-MCNC: 1.8 MG/DL
MCH RBC QN AUTO: 26.5 PG
MCHC RBC AUTO-ENTMCNC: 34 %
MCV RBC AUTO: 78 FL
MONOCYTES # BLD AUTO: 1.1 K/UL
MONOCYTES NFR BLD: 10.8 %
NEUTROPHILS # BLD AUTO: 7.9 K/UL
NEUTROPHILS NFR BLD: 76.3 %
PHOSPHATE SERPL-MCNC: 1.5 MG/DL
PLATELET # BLD AUTO: 79 K/UL
PLATELET BLD QL SMEAR: ABNORMAL
PMV BLD AUTO: ABNORMAL FL
POTASSIUM SERPL-SCNC: 3.3 MMOL/L
PROT SERPL-MCNC: 7.1 G/DL
RBC # BLD AUTO: 4.45 M/UL
SODIUM SERPL-SCNC: 139 MMOL/L
WBC # BLD AUTO: 10.39 K/UL

## 2017-02-27 PROCEDURE — 83036 HEMOGLOBIN GLYCOSYLATED A1C: CPT

## 2017-02-27 PROCEDURE — 25000003 PHARM REV CODE 250: Performed by: EMERGENCY MEDICINE

## 2017-02-27 PROCEDURE — 80053 COMPREHEN METABOLIC PANEL: CPT

## 2017-02-27 PROCEDURE — 21400001 HC TELEMETRY ROOM

## 2017-02-27 PROCEDURE — 83735 ASSAY OF MAGNESIUM: CPT

## 2017-02-27 PROCEDURE — 84100 ASSAY OF PHOSPHORUS: CPT

## 2017-02-27 PROCEDURE — 85025 COMPLETE CBC W/AUTO DIFF WBC: CPT

## 2017-02-27 PROCEDURE — 63600175 PHARM REV CODE 636 W HCPCS: Performed by: EMERGENCY MEDICINE

## 2017-02-27 PROCEDURE — 25000003 PHARM REV CODE 250: Performed by: INTERNAL MEDICINE

## 2017-02-27 PROCEDURE — C9113 INJ PANTOPRAZOLE SODIUM, VIA: HCPCS | Performed by: EMERGENCY MEDICINE

## 2017-02-27 RX ORDER — ONDANSETRON 2 MG/ML
4 INJECTION INTRAMUSCULAR; INTRAVENOUS EVERY 12 HOURS PRN
Status: DISCONTINUED | OUTPATIENT
Start: 2017-02-27 | End: 2017-02-27 | Stop reason: SDUPTHER

## 2017-02-27 RX ORDER — IPRATROPIUM BROMIDE AND ALBUTEROL SULFATE 2.5; .5 MG/3ML; MG/3ML
3 SOLUTION RESPIRATORY (INHALATION) EVERY 4 HOURS PRN
Status: DISCONTINUED | OUTPATIENT
Start: 2017-02-27 | End: 2017-03-01 | Stop reason: HOSPADM

## 2017-02-27 RX ORDER — OSELTAMIVIR PHOSPHATE 75 MG/1
75 CAPSULE ORAL 2 TIMES DAILY
Status: DISCONTINUED | OUTPATIENT
Start: 2017-02-27 | End: 2017-03-01 | Stop reason: HOSPADM

## 2017-02-27 RX ORDER — ACETAMINOPHEN 325 MG/1
650 TABLET ORAL EVERY 6 HOURS PRN
Status: DISCONTINUED | OUTPATIENT
Start: 2017-02-27 | End: 2017-03-01 | Stop reason: HOSPADM

## 2017-02-27 RX ORDER — FLUTICASONE PROPIONATE 50 MCG
1 SPRAY, SUSPENSION (ML) NASAL DAILY
Status: DISCONTINUED | OUTPATIENT
Start: 2017-02-27 | End: 2017-03-01 | Stop reason: HOSPADM

## 2017-02-27 RX ORDER — QUETIAPINE FUMARATE 25 MG/1
50 TABLET, FILM COATED ORAL NIGHTLY
Status: DISCONTINUED | OUTPATIENT
Start: 2017-02-27 | End: 2017-03-01 | Stop reason: HOSPADM

## 2017-02-27 RX ORDER — MEMANTINE HYDROCHLORIDE 10 MG/1
10 TABLET ORAL 2 TIMES DAILY
Status: DISCONTINUED | OUTPATIENT
Start: 2017-02-27 | End: 2017-03-01 | Stop reason: HOSPADM

## 2017-02-27 RX ORDER — HYDRALAZINE HYDROCHLORIDE 20 MG/ML
10 INJECTION INTRAMUSCULAR; INTRAVENOUS EVERY 6 HOURS PRN
Status: DISCONTINUED | OUTPATIENT
Start: 2017-02-27 | End: 2017-03-01 | Stop reason: HOSPADM

## 2017-02-27 RX ORDER — METOPROLOL TARTRATE 50 MG/1
50 TABLET ORAL 2 TIMES DAILY
Status: DISCONTINUED | OUTPATIENT
Start: 2017-02-27 | End: 2017-03-01 | Stop reason: HOSPADM

## 2017-02-27 RX ORDER — GUAIFENESIN 100 MG/5ML
200 SOLUTION ORAL EVERY 4 HOURS PRN
Status: DISCONTINUED | OUTPATIENT
Start: 2017-02-27 | End: 2017-03-01 | Stop reason: HOSPADM

## 2017-02-27 RX ORDER — MORPHINE SULFATE 2 MG/ML
2 INJECTION, SOLUTION INTRAMUSCULAR; INTRAVENOUS EVERY 4 HOURS PRN
Status: DISCONTINUED | OUTPATIENT
Start: 2017-02-27 | End: 2017-03-01 | Stop reason: HOSPADM

## 2017-02-27 RX ORDER — DEXTROSE MONOHYDRATE AND SODIUM CHLORIDE 5; .9 G/100ML; G/100ML
INJECTION, SOLUTION INTRAVENOUS CONTINUOUS
Status: DISCONTINUED | OUTPATIENT
Start: 2017-02-27 | End: 2017-02-28

## 2017-02-27 RX ORDER — DIPHENHYDRAMINE HCL 25 MG
25 CAPSULE ORAL EVERY 6 HOURS PRN
Status: DISCONTINUED | OUTPATIENT
Start: 2017-02-27 | End: 2017-03-01 | Stop reason: HOSPADM

## 2017-02-27 RX ORDER — PANTOPRAZOLE SODIUM 40 MG/10ML
40 INJECTION, POWDER, LYOPHILIZED, FOR SOLUTION INTRAVENOUS DAILY
Status: DISCONTINUED | OUTPATIENT
Start: 2017-02-27 | End: 2017-03-01 | Stop reason: HOSPADM

## 2017-02-27 RX ORDER — ACETAMINOPHEN 325 MG/1
650 TABLET ORAL EVERY 8 HOURS PRN
Status: DISCONTINUED | OUTPATIENT
Start: 2017-02-27 | End: 2017-02-27 | Stop reason: SDUPTHER

## 2017-02-27 RX ORDER — ONDANSETRON 2 MG/ML
4 INJECTION INTRAMUSCULAR; INTRAVENOUS EVERY 8 HOURS PRN
Status: DISCONTINUED | OUTPATIENT
Start: 2017-02-27 | End: 2017-03-01 | Stop reason: HOSPADM

## 2017-02-27 RX ORDER — MAG HYDROX/ALUMINUM HYD/SIMETH 200-200-20
30 SUSPENSION, ORAL (FINAL DOSE FORM) ORAL EVERY 6 HOURS PRN
Status: DISCONTINUED | OUTPATIENT
Start: 2017-02-27 | End: 2017-03-01 | Stop reason: HOSPADM

## 2017-02-27 RX ADMIN — MEMANTINE HYDROCHLORIDE 10 MG: 10 TABLET ORAL at 10:02

## 2017-02-27 RX ADMIN — FLUTICASONE PROPIONATE 1 SPRAY: 50 SPRAY, METERED NASAL at 10:02

## 2017-02-27 RX ADMIN — DEXTROSE AND SODIUM CHLORIDE: 5; .9 INJECTION, SOLUTION INTRAVENOUS at 03:02

## 2017-02-27 RX ADMIN — PANTOPRAZOLE SODIUM 40 MG: 40 INJECTION, POWDER, FOR SOLUTION INTRAVENOUS at 10:02

## 2017-02-27 RX ADMIN — CEFEPIME HYDROCHLORIDE 1 G: 1 INJECTION, POWDER, FOR SOLUTION INTRAMUSCULAR; INTRAVENOUS at 11:02

## 2017-02-27 RX ADMIN — QUETIAPINE FUMARATE 50 MG: 25 TABLET, FILM COATED ORAL at 09:02

## 2017-02-27 RX ADMIN — METOPROLOL TARTRATE 50 MG: 50 TABLET ORAL at 10:02

## 2017-02-27 RX ADMIN — MEMANTINE HYDROCHLORIDE 10 MG: 10 TABLET ORAL at 09:02

## 2017-02-27 RX ADMIN — OSELTAMIVIR PHOSPHATE 75 MG: 75 CAPSULE ORAL at 09:02

## 2017-02-27 RX ADMIN — METOPROLOL TARTRATE 50 MG: 50 TABLET ORAL at 09:02

## 2017-02-27 RX ADMIN — DEXTROSE AND SODIUM CHLORIDE: 5; .9 INJECTION, SOLUTION INTRAVENOUS at 01:02

## 2017-02-27 NOTE — ED PROVIDER NOTES
"SCRIBE #1 NOTE: I, Misa Culver, am scribing for, and in the presence of, Aristides Culver Jr., MD. I have scribed the entire note.      History      Chief Complaint   Patient presents with    Fever     family reports fever at home and that pt is breathing "differently"       Review of patient's allergies indicates:   Allergen Reactions    Penicillins      Other reaction(s): Unknown    Propoxyphene n-acetaminophen      Other reaction(s): Rash        HPI   HPI    2/26/2017, 7:29 PM   History obtained from the family        History of Present Illness: Radha Morales is a 85 y.o. female patient who presents to the Emergency Department for fever which onset gradually earlier today. Family member also reports that patient has had "shallow" breathing today. They state that patient had an episode of emesis yesterday after eating food. Symptoms are constant and moderate in severity. No mitigating or exacerbating factors reported. Patient has a PMHx of dementia and is living at home with several of her children. Patient has a nurse aide at home. HPI limited secondary to dementia.    Arrival mode: Personal vehicle    PCP: Ashleigh Escalante MD       Past Medical History:  Past Medical History:   Diagnosis Date    Alzheimer disease     Anemia     Carotid artery occlusion     Chronic kidney disease (CKD), stage III (moderate)     Coronary artery disease     HTN (hypertension)     Hyperlipidemia, acquired     Memory loss     Alzheimer's Disease    Obesity     Obesity (BMI 30.0-34.9) 12/9/2015    Osteoarthritis of multiple joints 7/12/2016    Pure hypercholesterolemia     UTI (lower urinary tract infection) 7/8/14       Past Surgical History:  Past Surgical History:   Procedure Laterality Date    CATARACT EXTRACTION W/  INTRAOCULAR LENS IMPLANT Bilateral     gallstones  2000s         Family History:  Family History   Problem Relation Age of Onset    Stroke Mother     Coronary artery disease Mother     Stroke " "Son        Social History:  Social History     Social History Main Topics    Smoking status: Never Smoker    Smokeless tobacco: Never Used    Alcohol use No    Drug use: No    Sexual activity: No       ROS   Review of Systems   Unable to perform ROS: Dementia       Physical Exam    Initial Vitals   BP Pulse Resp Temp SpO2   02/26/17 1908 02/26/17 1908 02/26/17 1908 02/26/17 1908 02/26/17 1908   170/90 96 20 99.5 °F (37.5 °C) 94 %      Physical Exam  Nursing Notes and Vital Signs Reviewed.  Constitutional: Patient is in no acute distress. Awake. Well-developed and well-nourished.  Head: Atraumatic. Normocephalic.  Eyes: PERRL. EOM intact. Conjunctivae are not pale. No scleral icterus.  ENT: Mucous membranes are moist. Oropharynx is clear and symmetric.    Neck: Supple. Full ROM.  Cardiovascular: Tachycardic. Regular rhythm. No murmurs, rubs, or gallops. Distal pulses are 2+ and symmetric.  Pulmonary/Chest: No respiratory distress. Slightly tachypneic. Clear to auscultation bilaterally. No wheezing, rales, or rhonchi.  Abdominal: Soft and non-distended.  There is vague tenderness to periumbilical region. No rebound, guarding, or rigidity.  Good bowel sounds.    Musculoskeletal: Moves all extremities. No obvious deformities. No edema. No calf tenderness.  Skin: Warm and dry.  Neurological:  Alert. Normal speech. No acute focal neurological deficits are appreciated.  Psychiatric: Demented, unable to communicate secondary to severe dementia.     ED Course    Procedures  ED Vital Signs:  Vitals:    02/26/17 1908 02/26/17 1929 02/26/17 1950 02/26/17 2025   BP: (!) 170/90 (!) 217/87     Pulse: 96 100 103    Resp: 20 (!) 26 (!) 23    Temp: 99.5 °F (37.5 °C)   (!) 101.5 °F (38.6 °C)   TempSrc: Oral   Rectal   SpO2: (!) 94% 95% 96%    Weight: 69.9 kg (154 lb)      Height: 5' 2" (1.575 m)       02/26/17 2032 02/26/17 2211 02/26/17 2218 02/26/17 2225   BP: (!) 160/94 (!) 152/115     Pulse: 103 106     Resp: (!) 23 (!) 26   "   Temp:   (!) 101.5 °F (38.6 °C) (!) 102.2 °F (39 °C)   TempSrc:    Rectal   SpO2: 98% 95%     Weight:       Height:        02/26/17 2247   BP: (!) 201/105   Pulse: (!) 121   Resp: (!) 23   Temp:    TempSrc:    SpO2: (!) 75%   Weight:    Height:        Abnormal Lab Results:  Labs Reviewed   URINALYSIS - Abnormal; Notable for the following:        Result Value    Protein, UA Trace (*)     Glucose, UA Trace (*)     Occult Blood UA 2+ (*)     All other components within normal limits   CBC W/ AUTO DIFFERENTIAL - Abnormal; Notable for the following:     Hematocrit 34.9 (*)     MCV 78 (*)     MCH 26.8 (*)     Platelets 131 (*)     Gran # 8.4 (*)     Gran% 80.1 (*)     Lymph% 10.6 (*)     All other components within normal limits   COMPREHENSIVE METABOLIC PANEL - Abnormal; Notable for the following:     Potassium 3.0 (*)     Glucose 139 (*)     Total Bilirubin 1.3 (*)     All other components within normal limits   B-TYPE NATRIURETIC PEPTIDE - Abnormal; Notable for the following:      (*)     All other components within normal limits   CULTURE, BLOOD   CULTURE, BLOOD   LACTIC ACID, PLASMA   INFLUENZA A AND B ANTIGEN   APTT   PROTIME-INR   URINALYSIS MICROSCOPIC        All Lab Results:  Results for orders placed or performed during the hospital encounter of 02/26/17   Urinalysis Catheterized   Result Value Ref Range    Specimen UA Urine, Catheterized     Color, UA Yellow Yellow, Straw, Shayy    Appearance, UA Clear Clear    pH, UA 7.0 5.0 - 8.0    Specific Gravity, UA 1.015 1.005 - 1.030    Protein, UA Trace (A) Negative    Glucose, UA Trace (A) Negative    Ketones, UA Negative Negative    Bilirubin (UA) Negative Negative    Occult Blood UA 2+ (A) Negative    Nitrite, UA Negative Negative    Urobilinogen, UA 1.0 <2.0 EU/dL    Leukocytes, UA Negative Negative   CBC auto differential   Result Value Ref Range    WBC 10.45 3.90 - 12.70 K/uL    RBC 4.47 4.00 - 5.40 M/uL    Hemoglobin 12.0 12.0 - 16.0 g/dL    Hematocrit  34.9 (L) 37.0 - 48.5 %    MCV 78 (L) 82 - 98 fL    MCH 26.8 (L) 27.0 - 31.0 pg    MCHC 34.4 32.0 - 36.0 %    RDW 13.6 11.5 - 14.5 %    Platelets 131 (L) 150 - 350 K/uL    MPV 11.5 9.2 - 12.9 fL    Gran # 8.4 (H) 1.8 - 7.7 K/uL    Lymph # 1.1 1.0 - 4.8 K/uL    Mono # 0.9 0.3 - 1.0 K/uL    Eos # 0.1 0.0 - 0.5 K/uL    Baso # 0.02 0.00 - 0.20 K/uL    Gran% 80.1 (H) 38.0 - 73.0 %    Lymph% 10.6 (L) 18.0 - 48.0 %    Mono% 8.8 4.0 - 15.0 %    Eosinophil% 0.5 0.0 - 8.0 %    Basophil% 0.2 0.0 - 1.9 %    Differential Method Automated    Comprehensive metabolic panel   Result Value Ref Range    Sodium 136 136 - 145 mmol/L    Potassium 3.0 (L) 3.5 - 5.1 mmol/L    Chloride 99 95 - 110 mmol/L    CO2 27 23 - 29 mmol/L    Glucose 139 (H) 70 - 110 mg/dL    BUN, Bld 8 8 - 23 mg/dL    Creatinine 0.7 0.5 - 1.4 mg/dL    Calcium 9.5 8.7 - 10.5 mg/dL    Total Protein 7.9 6.0 - 8.4 g/dL    Albumin 3.5 3.5 - 5.2 g/dL    Total Bilirubin 1.3 (H) 0.1 - 1.0 mg/dL    Alkaline Phosphatase 99 55 - 135 U/L    AST 28 10 - 40 U/L    ALT 23 10 - 44 U/L    Anion Gap 10 8 - 16 mmol/L    eGFR if African American >60 >60 mL/min/1.73 m^2    eGFR if non African American >60 >60 mL/min/1.73 m^2   Lactic acid, plasma   Result Value Ref Range    Lactate (Lactic Acid) 2.0 0.5 - 2.2 mmol/L   Influenza antigen Nasopharyngeal Swab   Result Value Ref Range    Influenza A Ag, EIA Negative Negative    Influenza B Ag, EIA Negative Negative    Flu A & B Source Nasopharyngeal Swab    Brain natriuretic peptide   Result Value Ref Range     (H) 0 - 99 pg/mL   APTT   Result Value Ref Range    aPTT 27.3 21.0 - 32.0 sec   Protime-INR   Result Value Ref Range    Prothrombin Time 11.4 9.0 - 12.5 sec    INR 1.1 0.8 - 1.2   Urinalysis Microscopic   Result Value Ref Range    RBC, UA 4 0 - 4 /hpf    WBC, UA 1 0 - 5 /hpf    Bacteria, UA None None-Occ /hpf    Squam Epithel, UA 1 /hpf    Microscopic Comment SEE COMMENT        Imaging Results:  Imaging Results         CT Abdomen  Pelvis With Contrast (Final result) Result time:  02/26/17 23:13:12    Final result by Bill Bhatt MD (02/26/17 23:13:12)    Impression:     Status post cholecystectomy.  Bile duct are mildly prominent in size.  There is moderate stool throughout the colon.      Electronically signed by: BILL BHATT MD  Date:     02/26/17  Time:    23:13     Narrative:    CT abdomen and pelvis with contrast    Clinical indication: Generalized abdominal pain, fever    Findings: Axial imaging was obtained through the abdomen and pelvis following intravenous contrast administration.  The liver and spleen are intact.  The gallbladder is surgically absent.  Bile duct is slightly prominent in size likely from prior cholecystectomy.  The pancreas is not enlarged.  There is a cyst in the left kidney.  No hydronephrosis.  No sign of aortic aneurysm.  There is moderate stool throughout colon.  No sign of bowel obstruction.  No sign of diverticulitis.  No pelvic masses.            CT Chest Without Contrast (Final result) Result time:  02/26/17 21:49:20    Final result by Bill Bhatt MD (02/26/17 21:49:20)    Impression:     Linear bands of volume loss at the lung bases.  No active infiltrates      Electronically signed by: BILL BHATT MD  Date:     02/26/17  Time:    21:49     Narrative:    CT of the chest without contrast    Clinical indication: Cough and fever    Findings: Axial imaging was obtained through the chest.  No contrast was utilized.  There are linear bands of volume loss at both lung bases.  Lungs elsewhere are clear.  No consolidation or pleural fluid.  No mediastinal masses or adenopathy.  The adrenal glands are not enlarged.            X-Ray Chest 1 View (Final result) Result time:  02/26/17 20:29:12    Procedure changed from X-Ray Chest PA And Lateral        Final result by Bill Bhatt MD (02/26/17 20:29:12)    Narrative:    Portable chest    Clinical indication: Shortness of breath    Findings: The  heart and lungs are normal.  No infiltrates.  No change from 07/06/2014    Pression: Normal chest      Electronically signed by: SILVANA BHATT MD  Date:     02/26/17  Time:    20:29              The EKG was ordered, reviewed, and independently interpreted by the ED provider.  Interpretation time: 19:43  Rate: 103 BPM  Rhythm: sinus tachycardia with premature supraventricular complexes  Interpretation: Minimal voltage criteria for LVH. Nonspecific T wave abnormality. No STEMI.    The Emergency Provider reviewed the vital signs and test results, which are outlined above.    ED Discussion   10:56 PM: Discussed case with Dr. Gaines ([facility]) who agrees with current care and management of pt and accepts admission.   Admitting Service: [facility]   Admitting Physician: Dr. Gaines  Admit to: Tele    11:01PM: Re-evaluated pt. I have discussed test results, shared treatment plan, and the need for admission with family at bedside. Family express understanding at this time and agree with all information. All questions answered. Family have no further questions or concerns at this time. Pt is ready for admit.     ED Medication(s):  Medications   cefepime 1g in dextrose 5% 50 mL IVPB (ready to mix system) (not administered)   vancomycin (VANCOCIN) 1,250 mg in dextrose 5 % 250 mL IVPB (not administered)   sodium chloride 0.9% bolus 2,097 mL (not administered)   labetalol injection 10 mg (not administered)   potassium chloride 10% solution 60 mEq (60 mEq Oral Given 2/26/17 2212)   acetaminophen suppository 650 mg (650 mg Rectal Given 2/26/17 2218)   labetalol injection 20 mg (20 mg Intravenous Given 2/26/17 2303)   omnipaque 350 iohexol 75 mL (75 mLs Intravenous Given 2/26/17 2244)       New Prescriptions    No medications on file            Medical Decision Making    Medical Decision Making:   Clinical Tests:   Lab Tests: Ordered and Reviewed  Radiological Study: Ordered and Reviewed  Medical Tests: Ordered and  Reviewed           Scribe Attestation:   Scribe #1: I performed the above scribed service and the documentation accurately describes the services I performed. I attest to the accuracy of the note.    Attending:   Physician Attestation Statement for Scribe #1: I, Aristides Culver Jr., MD, personally performed the services described in this documentation, as scribed by Misa Culver, in my presence, and it is both accurate and complete.          Clinical Impression       ICD-10-CM ICD-9-CM   1. Tachycardia R00.0 785.0   2. Fever R50.9 780.60   3. Aspiration into lower respiratory tract, initial encounter T17.800A 934.9   4. Late onset Alzheimer's disease without behavioral disturbance G30.1 331.0    F02.80 294.10   5. Sepsis A41.9 038.9     995.91       Disposition:   Disposition: Admitted  Condition: Fair         Aristides Culver Jr., MD  02/27/17 0151

## 2017-02-27 NOTE — ASSESSMENT & PLAN NOTE
- Unclear source yet  - F/u blood and urine cultures  - Continue IV fluids  - IV antibiotics (Vanc and Cefepime empirically)

## 2017-02-27 NOTE — PROGRESS NOTES
Radha Morales [id number] is a 85 y.o. female who has been consulted for vancomycin dosing.  Consult ordered by Dr. Aristides Culver  Dx: Sepsis with unknown origin  Tmax: 102.2  Vancomycin trough goal = 15-20 mcg/mL    The patient has the following labs:     Date Creatinine (mg/dl)    BUN WBC Count   2/27/2017 Estimated Creatinine Clearance: 53.8 mL/min (based on Cr of 0.7). Lab Results   Component Value Date    BUN 8 02/26/2017     Lab Results   Component Value Date    WBC 10.45 02/26/2017      which calculates to an Estimated Creatinine Clearance: 53.8 mL/min (based on Cr of 0.7)..       Current weight is 69.9 kg (154 lb)  We used an adjusted weight for dosing of 58 kg    The patient was started on vancomycin at a dose of 1250 mg for one dose in ER.  She will receive 1 GM Vancomycin every 24 hours in order to reach a trough goal of 15-20 mcg/mL.    Patient will be followed by pharmacy for changes in renal function, toxicity, and efficacy.  The vancomycin trough has been ordered for 2/28/17 at 2230.      Thank you for allowing us to participate in this patient's care.     Jose A Chang

## 2017-02-27 NOTE — SUBJECTIVE & OBJECTIVE
Past Medical History:   Diagnosis Date    Alzheimer disease     Anemia     Carotid artery occlusion     Chronic kidney disease (CKD), stage III (moderate)     Coronary artery disease     HTN (hypertension)     Hyperlipidemia, acquired     Memory loss     Alzheimer's Disease    Obesity     Obesity (BMI 30.0-34.9) 12/9/2015    Osteoarthritis of multiple joints 7/12/2016    Pure hypercholesterolemia     UTI (lower urinary tract infection) 7/8/14       Past Surgical History:   Procedure Laterality Date    CATARACT EXTRACTION W/  INTRAOCULAR LENS IMPLANT Bilateral     gallstones  2000s       Review of patient's allergies indicates:   Allergen Reactions    Penicillins      Other reaction(s): Unknown    Propoxyphene n-acetaminophen      Other reaction(s): Rash       No current facility-administered medications on file prior to encounter.      Current Outpatient Prescriptions on File Prior to Encounter   Medication Sig    aspirin (ASPIRIN CHILDRENS) 81 MG Chew Take by mouth. 1 Tablet, Chewable Oral Every day    cholecalciferol, vitamin D3, 2,000 unit Cap Take 1 capsule (2,000 Units total) by mouth once daily.    fluticasone (FLONASE) 50 mcg/actuation nasal spray 1 spray by Each Nare route once daily.    furosemide (LASIX) 20 MG tablet TAKE 1 TABLET BY MOUTH ONCE A DAY    memantine (NAMENDA) 10 MG Tab TAKE ONE TABLET BY MOUTH TWICE DAILY    metoprolol tartrate (LOPRESSOR) 50 MG tablet TAKE ONE TABLET BY MOUTH TWICE DAILY    quetiapine (SEROQUEL) 50 MG tablet TAKE 1 OR 2 TABLETS BY MOUTH AT BEDTIME     Family History     Problem Relation (Age of Onset)    Coronary artery disease Mother    Stroke Mother, Son        Social History Main Topics    Smoking status: Never Smoker    Smokeless tobacco: Never Used    Alcohol use No    Drug use: No    Sexual activity: No     Review of Systems   Unable to perform ROS: Dementia     Objective:     Vital Signs (Most Recent):  Temp: (!) 102.2 °F (39 °C) (02/26/17  2225)  Pulse: 106 (02/26/17 2211)  Resp: (!) 26 (02/26/17 2211)  BP: (!) 152/115 (02/26/17 2211)  SpO2: 95 % (02/26/17 2211) Vital Signs (24h Range):  Temp:  [99.5 °F (37.5 °C)-102.2 °F (39 °C)] 102.2 °F (39 °C)  Pulse:  [] 106  Resp:  [20-26] 26  SpO2:  [94 %-98 %] 95 %  BP: (152-217)/() 152/115     Weight: 69.9 kg (154 lb)  Body mass index is 28.17 kg/(m^2).    Physical Exam   Constitutional: No distress.   Elderly demented   HENT:   Head: Normocephalic and atraumatic.   Eyes: Conjunctivae and EOM are normal. No scleral icterus.   Neck: Normal range of motion. Neck supple. No JVD present. No tracheal deviation present. No thyromegaly present.   Cardiovascular: Normal rate, regular rhythm, normal heart sounds and intact distal pulses.    No murmur heard.  Pulmonary/Chest: Effort normal and breath sounds normal. No respiratory distress. She has no wheezes. She has no rales. She exhibits no tenderness.   Abdominal: Soft. Bowel sounds are normal. She exhibits no distension. There is no tenderness.   Musculoskeletal: Normal range of motion. She exhibits no edema or tenderness.   Lymphadenopathy:     She has no cervical adenopathy.   Neurological: She is alert. She is disoriented. No cranial nerve deficit. She exhibits normal muscle tone.   Skin: Skin is warm and dry. She is not diaphoretic. No erythema.   Psychiatric: Cognition and memory are impaired.   Nursing note and vitals reviewed.       Significant Labs:   BMP:   Recent Labs  Lab 02/26/17 2010   *      K 3.0*   CL 99   CO2 27   BUN 8   CREATININE 0.7   CALCIUM 9.5     CBC:   Recent Labs  Lab 02/26/17 2010   WBC 10.45   HGB 12.0   HCT 34.9*   *     CMP:   Recent Labs  Lab 02/26/17 2010      K 3.0*   CL 99   CO2 27   *   BUN 8   CREATININE 0.7   CALCIUM 9.5   PROT 7.9   ALBUMIN 3.5   BILITOT 1.3*   ALKPHOS 99   AST 28   ALT 23   ANIONGAP 10   EGFRNONAA >60     Cardiac Markers:   Recent Labs  Lab 02/26/17 2010   BNP  169*     Troponin: No results for input(s): TROPONINI in the last 48 hours.  Urine Studies:   Recent Labs  Lab 02/26/17 2015   COLORU Yellow   APPEARANCEUA Clear   PHUR 7.0   SPECGRAV 1.015   PROTEINUA Trace*   GLUCUA Trace*   KETONESU Negative   BILIRUBINUA Negative   OCCULTUA 2+*   NITRITE Negative   UROBILINOGEN 1.0   LEUKOCYTESUR Negative   RBCUA 4   WBCUA 1   BACTERIA None   SQUAMEPITHEL 1     All pertinent labs within the past 24 hours have been reviewed.    Significant Imaging:   Imaging Results         CT Abdomen Pelvis With Contrast (In process)         CT Chest Without Contrast (Final result) Result time:  02/26/17 21:49:20    Final result by Bill Bhatt MD (02/26/17 21:49:20)    Impression:     Linear bands of volume loss at the lung bases.  No active infiltrates      Electronically signed by: BILL BHATT MD  Date:     02/26/17  Time:    21:49     Narrative:    CT of the chest without contrast    Clinical indication: Cough and fever    Findings: Axial imaging was obtained through the chest.  No contrast was utilized.  There are linear bands of volume loss at both lung bases.  Lungs elsewhere are clear.  No consolidation or pleural fluid.  No mediastinal masses or adenopathy.  The adrenal glands are not enlarged.            X-Ray Chest 1 View (Final result) Result time:  02/26/17 20:29:12    Procedure changed from X-Ray Chest PA And Lateral        Final result by Bill Bhatt MD (02/26/17 20:29:12)    Narrative:    Portable chest    Clinical indication: Shortness of breath    Findings: The heart and lungs are normal.  No infiltrates.  No change from 07/06/2014    Pression: Normal chest      Electronically signed by: BILL BHATT MD  Date:     02/26/17  Time:    20:29

## 2017-02-27 NOTE — PROGRESS NOTES
Bed side swallowing evaluation performed, pt tolerated teaspoon of water, tablespoon of applesauce given, unable to swallow using straw d/t biting on straw. No thin liquids given for safety at this time, will f/u with speech.

## 2017-02-27 NOTE — PLAN OF CARE
Problem: Patient Care Overview  Goal: Plan of Care Review  Outcome: Ongoing (interventions implemented as appropriate)  Pt free from falls, call bell and belongings within reach, bed alarm on, IVF cont, NSR on tele monitor, turned and repositioned q2 hrs.

## 2017-02-27 NOTE — ASSESSMENT & PLAN NOTE
- Aspiration reported by the family  - CXR and CT chest does not show evidence of infiltrates  - CT abdomen/pelvis pending  -

## 2017-02-27 NOTE — H&P
"[facility] Hospital Medicine  History & Physical    Patient Name: Radha Morales  MRN: [id number]  Admission Date: 2/26/2017  Attending Physician: Damion Gaines MD  Primary Care Provider: Ashleigh Escalante MD         Patient information was obtained from relative(s) and ER records.     Subjective:     Principal Problem:Sepsis    Chief Complaint:   Chief Complaint   Patient presents with    Fever     family reports fever at home and that pt is breathing "differently"        HPI: Ms. Morales is an elderly demented AA female with h/o HTN, CAD, lives at home, was brought in by her family saying that the patient is SOB and not acting right for the past 2 days. Reported to have high fever of 102.1 at home, uncomfortable. Patient is demented, cannot obtain history. Discussed with son and daughter at the bed side. Temp 102.1, , RR 24. WBC normal. CXR does not show infiltrates. CT chest does not show active infiltrates.    Past Medical History:   Diagnosis Date    Alzheimer disease     Anemia     Carotid artery occlusion     Chronic kidney disease (CKD), stage III (moderate)     Coronary artery disease     HTN (hypertension)     Hyperlipidemia, acquired     Memory loss     Alzheimer's Disease    Obesity     Obesity (BMI 30.0-34.9) 12/9/2015    Osteoarthritis of multiple joints 7/12/2016    Pure hypercholesterolemia     UTI (lower urinary tract infection) 7/8/14       Past Surgical History:   Procedure Laterality Date    CATARACT EXTRACTION W/  INTRAOCULAR LENS IMPLANT Bilateral     gallstones  2000s       Review of patient's allergies indicates:   Allergen Reactions    Penicillins      Other reaction(s): Unknown    Propoxyphene n-acetaminophen      Other reaction(s): Rash       No current facility-administered medications on file prior to encounter.      Current Outpatient Prescriptions on File Prior to Encounter   Medication Sig    aspirin (ASPIRIN CHILDRENS) 81 MG Chew Take by " mouth. 1 Tablet, Chewable Oral Every day    cholecalciferol, vitamin D3, 2,000 unit Cap Take 1 capsule (2,000 Units total) by mouth once daily.    fluticasone (FLONASE) 50 mcg/actuation nasal spray 1 spray by Each Nare route once daily.    furosemide (LASIX) 20 MG tablet TAKE 1 TABLET BY MOUTH ONCE A DAY    memantine (NAMENDA) 10 MG Tab TAKE ONE TABLET BY MOUTH TWICE DAILY    metoprolol tartrate (LOPRESSOR) 50 MG tablet TAKE ONE TABLET BY MOUTH TWICE DAILY    quetiapine (SEROQUEL) 50 MG tablet TAKE 1 OR 2 TABLETS BY MOUTH AT BEDTIME     Family History     Problem Relation (Age of Onset)    Coronary artery disease Mother    Stroke Mother, Son        Social History Main Topics    Smoking status: Never Smoker    Smokeless tobacco: Never Used    Alcohol use No    Drug use: No    Sexual activity: No     Review of Systems   Unable to perform ROS: Dementia     Objective:     Vital Signs (Most Recent):  Temp: (!) 102.2 °F (39 °C) (02/26/17 2225)  Pulse: 106 (02/26/17 2211)  Resp: (!) 26 (02/26/17 2211)  BP: (!) 152/115 (02/26/17 2211)  SpO2: 95 % (02/26/17 2211) Vital Signs (24h Range):  Temp:  [99.5 °F (37.5 °C)-102.2 °F (39 °C)] 102.2 °F (39 °C)  Pulse:  [] 106  Resp:  [20-26] 26  SpO2:  [94 %-98 %] 95 %  BP: (152-217)/() 152/115     Weight: 69.9 kg (154 lb)  Body mass index is 28.17 kg/(m^2).    Physical Exam   Constitutional: No distress.   Elderly demented   HENT:   Head: Normocephalic and atraumatic.   Eyes: Conjunctivae and EOM are normal. No scleral icterus.   Neck: Normal range of motion. Neck supple. No JVD present. No tracheal deviation present. No thyromegaly present.   Cardiovascular: Normal rate, regular rhythm, normal heart sounds and intact distal pulses.    No murmur heard.  Pulmonary/Chest: Effort normal and breath sounds normal. No respiratory distress. She has no wheezes. She has no rales. She exhibits no tenderness.   Abdominal: Soft. Bowel sounds are normal. She exhibits no  distension. There is no tenderness.   Musculoskeletal: Normal range of motion. She exhibits no edema or tenderness.   Lymphadenopathy:     She has no cervical adenopathy.   Neurological: She is alert. She is disoriented. No cranial nerve deficit. She exhibits normal muscle tone.   Skin: Skin is warm and dry. She is not diaphoretic. No erythema.   Psychiatric: Cognition and memory are impaired.   Nursing note and vitals reviewed.       Significant Labs:   BMP:   Recent Labs  Lab 02/26/17 2010   *      K 3.0*   CL 99   CO2 27   BUN 8   CREATININE 0.7   CALCIUM 9.5     CBC:   Recent Labs  Lab 02/26/17 2010   WBC 10.45   HGB 12.0   HCT 34.9*   *     CMP:   Recent Labs  Lab 02/26/17 2010      K 3.0*   CL 99   CO2 27   *   BUN 8   CREATININE 0.7   CALCIUM 9.5   PROT 7.9   ALBUMIN 3.5   BILITOT 1.3*   ALKPHOS 99   AST 28   ALT 23   ANIONGAP 10   EGFRNONAA >60     Cardiac Markers:   Recent Labs  Lab 02/26/17 2010   *     Troponin: No results for input(s): TROPONINI in the last 48 hours.  Urine Studies:   Recent Labs  Lab 02/26/17 2015   COLORU Yellow   APPEARANCEUA Clear   PHUR 7.0   SPECGRAV 1.015   PROTEINUA Trace*   GLUCUA Trace*   KETONESU Negative   BILIRUBINUA Negative   OCCULTUA 2+*   NITRITE Negative   UROBILINOGEN 1.0   LEUKOCYTESUR Negative   RBCUA 4   WBCUA 1   BACTERIA None   SQUAMEPITHEL 1     All pertinent labs within the past 24 hours have been reviewed.    Significant Imaging:   Imaging Results         CT Abdomen Pelvis With Contrast (In process)         CT Chest Without Contrast (Final result) Result time:  02/26/17 21:49:20    Final result by Bill Bhatt MD (02/26/17 21:49:20)    Impression:     Linear bands of volume loss at the lung bases.  No active infiltrates      Electronically signed by: BILL BHATT MD  Date:     02/26/17  Time:    21:49     Narrative:    CT of the chest without contrast    Clinical indication: Cough and fever    Findings: Axial  imaging was obtained through the chest.  No contrast was utilized.  There are linear bands of volume loss at both lung bases.  Lungs elsewhere are clear.  No consolidation or pleural fluid.  No mediastinal masses or adenopathy.  The adrenal glands are not enlarged.            X-Ray Chest 1 View (Final result) Result time:  02/26/17 20:29:12    Procedure changed from X-Ray Chest PA And Lateral        Final result by Bill Bhatt MD (02/26/17 20:29:12)    Narrative:    Portable chest    Clinical indication: Shortness of breath    Findings: The heart and lungs are normal.  No infiltrates.  No change from 07/06/2014    Pression: Normal chest      Electronically signed by: BILL BHATT MD  Date:     02/26/17  Time:    20:29                 Assessment/Plan:     * Sepsis  - Unclear source yet  - F/u blood and urine cultures  - Continue IV fluids  - IV antibiotics (Vanc and Cefepime empirically)      Aspiration into airway  - Aspiration reported by the family  - CXR and CT chest does not show evidence of infiltrates  - CT abdomen/pelvis pending  -       CAD (coronary artery disease)  - Resume home medications      Essential hypertension  - Elevated, resume home medications  - Hydralazine IV prn      Late onset Alzheimer's disease without behavioral disturbance  - Unable to obtain any history due to advanced dementia      VTE Risk Mitigation     SCDs        Damion Gaines MD  Department of Hospital Medicine   [facility]

## 2017-02-27 NOTE — ED NOTES
"Family reports that pt had an episode of vomiting yesterday and appeared to possibly be having some abd discomfort. Pt started running fever today and was reported to be breathing "funny."  "

## 2017-02-27 NOTE — PLAN OF CARE
Problem: Patient Care Overview  Goal: Plan of Care Review  Outcome: Ongoing (interventions implemented as appropriate)  Pt non-verbal, continue on IV fluid hydration, monitor for elevated temp, normal sinus rhythm, speech evaluation tomorrow, bed alarm set, awaiting blood culture results, family at bedside

## 2017-02-27 NOTE — NURSING
Pt arrived via stretcher in stable condition, awake and confused, family at bedside. Vs taken, placed on tele monitor and bed alarm

## 2017-02-27 NOTE — PLAN OF CARE
Initial assessment completed. Met with patient and several family members. Patient's daughter indicated that she has pca waiver sitters for 8 hrs a day x 6 days a week. She would like for her mother to receive home health services as well as retain her pca sitters. Sr Resource Guide provided with listing of area home health agencies. Preference letter presented, signed and placed in chart. Karen Anderson Liaison with [facility] notified of referral. Refrral faxed via [facility] to [facility]. Patient's daughter request a wheelchair, request given to physician. Gave contact information and instructed family to call me with any questions or concerns.      02/27/17 1140   Discharge Assessment   Assessment Type Discharge Planning Assessment   Confirmed/corrected address and phone number on facesheet? Yes   Assessment information obtained from? Patient;Caregiver;Medical Record   Expected Length of Stay (days) (tbd)   Prior to hospitilization cognitive status: Unable to Assess   Prior to hospitalization functional status: Assistive Equipment;Needs Assistance   Current cognitive status: Unable to Assess   Current Functional Status: Needs Assistance;Assistive Equipment;Partially Dependent   Arrived From home or self-care   Lives With child(antonio), adult   Able to Return to Prior Arrangements unable to determine at this time (comments)   Is patient able to care for self after discharge? Unable to determine at this time (comments)   How many people do you have in your home that can help with your care after discharge? 1   Who are your caregiver(s) and their phone number(s)? Michael David ( son ) [phone]   Patient's perception of discharge disposition home or selfcare   Readmission Within The Last 30 Days no previous admission in last 30 days   Patient currently being followed by outpatient case management? No   Patient currently receives home health services? No   Does the patient currently use HME? Yes   Patient  currently receives any other outside agency services? Yes   How many hours a day does the patient receive services? 8  (8 hrs a day for 6 days a week)   Name and contact number of agency or person providing outside services (pca waiver program 8 hrs a day x 6 days a week)   Equipment Currently Used at Home cane, straight;shower chair   Do you have any problems affording any of your prescribed medications? No   Is the patient taking medications as prescribed? yes   Do you have any financial concerns preventing you from receiving the healthcare you need? No   Does the patient have transportation to healthcare appointments? Yes   Transportation Available family or friend will provide;car   On Dialysis? No   Does the patient receive services at the [facility]? No   Are there any open cases? No   Discharge Plan A Home;Home with family   Discharge Plan B Home;Home with family;Home Health   Patient/Family In Agreement With Plan yes

## 2017-02-28 LAB
ALBUMIN SERPL BCP-MCNC: 3 G/DL
ALP SERPL-CCNC: 91 U/L
ALT SERPL W/O P-5'-P-CCNC: 22 U/L
ANION GAP SERPL CALC-SCNC: 9 MMOL/L
AST SERPL-CCNC: 26 U/L
BASOPHILS # BLD AUTO: 0.02 K/UL
BASOPHILS NFR BLD: 0.2 %
BILIRUB SERPL-MCNC: 1.5 MG/DL
BUN SERPL-MCNC: 7 MG/DL
CALCIUM SERPL-MCNC: 8.9 MG/DL
CHLORIDE SERPL-SCNC: 104 MMOL/L
CO2 SERPL-SCNC: 25 MMOL/L
CREAT SERPL-MCNC: 0.7 MG/DL
DIFFERENTIAL METHOD: ABNORMAL
EOSINOPHIL # BLD AUTO: 0.2 K/UL
EOSINOPHIL NFR BLD: 1.6 %
ERYTHROCYTE [DISTWIDTH] IN BLOOD BY AUTOMATED COUNT: 13.8 %
EST. GFR  (AFRICAN AMERICAN): >60 ML/MIN/1.73 M^2
EST. GFR  (NON AFRICAN AMERICAN): >60 ML/MIN/1.73 M^2
ESTIMATED AVG GLUCOSE: 94 MG/DL
GIANT PLATELETS BLD QL SMEAR: PRESENT
GLUCOSE SERPL-MCNC: 130 MG/DL
HBA1C MFR BLD HPLC: 4.9 %
HCT VFR BLD AUTO: 30.9 %
HGB BLD-MCNC: 10.6 G/DL
LYMPHOCYTES # BLD AUTO: 1.5 K/UL
LYMPHOCYTES NFR BLD: 15 %
MCH RBC QN AUTO: 27 PG
MCHC RBC AUTO-ENTMCNC: 34.3 %
MCV RBC AUTO: 79 FL
MONOCYTES # BLD AUTO: 1.1 K/UL
MONOCYTES NFR BLD: 11.5 %
NEUTROPHILS # BLD AUTO: 7 K/UL
NEUTROPHILS NFR BLD: 72 %
PLATELET # BLD AUTO: 104 K/UL
PLATELET BLD QL SMEAR: ABNORMAL
PMV BLD AUTO: 11.4 FL
POTASSIUM SERPL-SCNC: 3.1 MMOL/L
PROT SERPL-MCNC: 6.9 G/DL
RBC # BLD AUTO: 3.92 M/UL
SODIUM SERPL-SCNC: 138 MMOL/L
WBC # BLD AUTO: 9.72 K/UL

## 2017-02-28 PROCEDURE — 92610 EVALUATE SWALLOWING FUNCTION: CPT

## 2017-02-28 PROCEDURE — 21400001 HC TELEMETRY ROOM

## 2017-02-28 PROCEDURE — 63600175 PHARM REV CODE 636 W HCPCS: Performed by: INTERNAL MEDICINE

## 2017-02-28 PROCEDURE — 25000003 PHARM REV CODE 250: Performed by: INTERNAL MEDICINE

## 2017-02-28 PROCEDURE — C9113 INJ PANTOPRAZOLE SODIUM, VIA: HCPCS | Performed by: EMERGENCY MEDICINE

## 2017-02-28 PROCEDURE — 80053 COMPREHEN METABOLIC PANEL: CPT

## 2017-02-28 PROCEDURE — 80202 ASSAY OF VANCOMYCIN: CPT

## 2017-02-28 PROCEDURE — 25000003 PHARM REV CODE 250: Performed by: EMERGENCY MEDICINE

## 2017-02-28 PROCEDURE — 63600175 PHARM REV CODE 636 W HCPCS: Performed by: EMERGENCY MEDICINE

## 2017-02-28 PROCEDURE — 36415 COLL VENOUS BLD VENIPUNCTURE: CPT

## 2017-02-28 PROCEDURE — 85025 COMPLETE CBC W/AUTO DIFF WBC: CPT

## 2017-02-28 RX ORDER — DEXTROSE MONOHYDRATE, SODIUM CHLORIDE, AND POTASSIUM CHLORIDE 50; 1.49; 4.5 G/1000ML; G/1000ML; G/1000ML
INJECTION, SOLUTION INTRAVENOUS CONTINUOUS
Status: DISCONTINUED | OUTPATIENT
Start: 2017-02-28 | End: 2017-03-01 | Stop reason: HOSPADM

## 2017-02-28 RX ORDER — SODIUM,POTASSIUM PHOSPHATES 280-250MG
1 POWDER IN PACKET (EA) ORAL
Status: DISCONTINUED | OUTPATIENT
Start: 2017-02-28 | End: 2017-03-01 | Stop reason: HOSPADM

## 2017-02-28 RX ADMIN — DEXTROSE AND SODIUM CHLORIDE: 5; .9 INJECTION, SOLUTION INTRAVENOUS at 06:02

## 2017-02-28 RX ADMIN — MEMANTINE HYDROCHLORIDE 10 MG: 10 TABLET ORAL at 10:02

## 2017-02-28 RX ADMIN — METOPROLOL TARTRATE 50 MG: 50 TABLET ORAL at 08:02

## 2017-02-28 RX ADMIN — PANTOPRAZOLE SODIUM 40 MG: 40 INJECTION, POWDER, FOR SOLUTION INTRAVENOUS at 10:02

## 2017-02-28 RX ADMIN — OSELTAMIVIR PHOSPHATE 75 MG: 75 CAPSULE ORAL at 10:02

## 2017-02-28 RX ADMIN — CEFEPIME HYDROCHLORIDE 1 G: 1 INJECTION, POWDER, FOR SOLUTION INTRAMUSCULAR; INTRAVENOUS at 11:02

## 2017-02-28 RX ADMIN — DEXTROSE MONOHYDRATE, SODIUM CHLORIDE, AND POTASSIUM CHLORIDE: 50; 4.5; 1.49 INJECTION, SOLUTION INTRAVENOUS at 09:02

## 2017-02-28 RX ADMIN — METOPROLOL TARTRATE 50 MG: 50 TABLET ORAL at 10:02

## 2017-02-28 RX ADMIN — FLUTICASONE PROPIONATE 1 SPRAY: 50 SPRAY, METERED NASAL at 10:02

## 2017-02-28 RX ADMIN — CEFEPIME HYDROCHLORIDE 1 G: 1 INJECTION, POWDER, FOR SOLUTION INTRAMUSCULAR; INTRAVENOUS at 12:02

## 2017-02-28 RX ADMIN — MEMANTINE HYDROCHLORIDE 10 MG: 10 TABLET ORAL at 08:02

## 2017-02-28 RX ADMIN — POTASSIUM & SODIUM PHOSPHATES POWDER PACK 280-160-250 MG 1 PACKET: 280-160-250 PACK at 06:02

## 2017-02-28 RX ADMIN — QUETIAPINE FUMARATE 50 MG: 25 TABLET, FILM COATED ORAL at 08:02

## 2017-02-28 RX ADMIN — OSELTAMIVIR PHOSPHATE 75 MG: 75 CAPSULE ORAL at 08:02

## 2017-02-28 RX ADMIN — POTASSIUM & SODIUM PHOSPHATES POWDER PACK 280-160-250 MG 1 PACKET: 280-160-250 PACK at 08:02

## 2017-02-28 RX ADMIN — VANCOMYCIN HYDROCHLORIDE 1000 MG: 1 INJECTION, POWDER, LYOPHILIZED, FOR SOLUTION INTRAVENOUS at 12:02

## 2017-02-28 RX ADMIN — POTASSIUM & SODIUM PHOSPHATES POWDER PACK 280-160-250 MG 1 PACKET: 280-160-250 PACK at 12:02

## 2017-02-28 NOTE — PLAN OF CARE
Problem: Thought Process Alteration (Adult)  Intervention: Optimize Communication  Family at bedside to communicate with

## 2017-02-28 NOTE — PROGRESS NOTES
[facility] Hospital Medicine  Progress Note    Patient Name: Radha Morales  MRN: [id number]  Patient Class: IP- Inpatient   Admission Date: 2/26/2017  Length of Stay: 1 days  Attending Physician: Sam Matthew MD  Primary Care Provider: Ashleigh Escalante MD        Subjective:     Principal Problem:Sepsis    HPI:  Ms. Morales is an elderly demented AA female with h/o HTN, CAD, lives at home, was brought in by her family saying that the patient is SOB and not acting right for the past 2 days. Reported to have high fever of 102.1 at home, uncomfortable. Patient is demented, cannot obtain history. Discussed with son and daughter at the bed side. Temp 102.1, , RR 24. WBC normal. CXR does not show infiltrates. CT chest does not show active infiltrates.    Hospital Course:       Interval History:  No acute complaints.  Difficulty remembering events leading up to admission.    Review of Systems   Constitutional: Negative for chills and fever.   HENT: Negative for congestion and sore throat.    Eyes: Negative for visual disturbance.   Respiratory: Negative for cough, shortness of breath and wheezing.    Cardiovascular: Negative for chest pain, palpitations and leg swelling.   Gastrointestinal: Negative for abdominal pain, blood in stool, constipation, diarrhea, nausea and vomiting.   Genitourinary: Negative for dysuria and hematuria.   Musculoskeletal: Negative for arthralgias and back pain.   Skin: Negative for rash and wound.   Neurological: Negative for dizziness, weakness, light-headedness and numbness.   Hematological: Negative for adenopathy.     Objective:     Vital Signs (Most Recent):  Temp: 98.7 °F (37.1 °C) (02/27/17 1836)  Pulse: 99 (02/27/17 1200)  Resp: 20 (02/27/17 1836)  BP: 134/87 (02/27/17 1836)  SpO2: 97 % (02/27/17 1836) Vital Signs (24h Range):  Temp:  [97.6 °F (36.4 °C)-102.2 °F (39 °C)] 98.7 °F (37.1 °C)  Pulse:  [] 99  Resp:  [18-28] 20  SpO2:  [75 %-97 %] 97  %  BP: (117-201)/() 134/87     Weight: 69.9 kg (154 lb)  Body mass index is 28.17 kg/(m^2).    Intake/Output Summary (Last 24 hours) at 02/27/17 2145  Last data filed at 02/27/17 1555   Gross per 24 hour   Intake          1191.25 ml   Output                0 ml   Net          1191.25 ml      Physical Exam   Constitutional: She appears well-developed and well-nourished. No distress.   HENT:   Head: Normocephalic and atraumatic.   Mouth/Throat: Oropharynx is clear and moist.   Eyes: Conjunctivae and EOM are normal. Pupils are equal, round, and reactive to light.   Neck: Neck supple. No JVD present. No thyromegaly present.   Cardiovascular: Normal rate and regular rhythm.  Exam reveals no gallop and no friction rub.    No murmur heard.  Pulmonary/Chest: Effort normal and breath sounds normal. She has no wheezes. She has no rales.   Abdominal: Soft. Bowel sounds are normal. She exhibits no distension. There is no tenderness. There is no rebound and no guarding.   Musculoskeletal: Normal range of motion. She exhibits no edema or deformity.   Lymphadenopathy:     She has no cervical adenopathy.   Neurological: She is alert. She has normal reflexes.   Skin: Skin is warm and dry. No rash noted.   Nursing note and vitals reviewed.      Significant Labs:   CBC:   Recent Labs  Lab 02/26/17 2010 02/27/17  0403   WBC 10.45 10.39   HGB 12.0 11.8*   HCT 34.9* 34.7*   * 79*     CMP:   Recent Labs  Lab 02/26/17 2010 02/27/17  0403    139   K 3.0* 3.3*   CL 99 107   CO2 27 23   * 151*   BUN 8 7*   CREATININE 0.7 0.7   CALCIUM 9.5 8.6*   PROT 7.9 7.1   ALBUMIN 3.5 3.1*   BILITOT 1.3* 1.3*   ALKPHOS 99 93   AST 28 26   ALT 23 21   ANIONGAP 10 9   EGFRNONAA >60 >60       Significant Imaging: I have reviewed all pertinent imaging results/findings within the past 24 hours.    Assessment/Plan:      * Sepsis  - Unclear source yet.  Possible viral URI - Flu negative.  Add Tamiflu empirically.  - F/u blood and  urine cultures  - Continue IV fluids  - IV antibiotics (Vanc and Cefepime empirically)    CAD (coronary artery disease)  - Resume home medications    Late onset Alzheimer's disease without behavioral disturbance  - Unable to obtain any history due to advanced dementia    Essential hypertension  - Elevated, resume home medications  - Hydralazine IV prn    Aspiration into airway  - Aspiration reported by the family  - CXR and CT chest does not show evidence of infiltrates  - CT abdomen/pelvis unrevealing    VTE Risk Mitigation         Ordered     Medium Risk of VTE  Once      02/27/17 0133     Place MARY hose  Until discontinued      02/27/17 0133     Place sequential compression device  Until discontinued      02/27/17 0133     Reason for No Pharmacological VTE Prophylaxis  Once      02/27/17 0133          Sam Matthew MD  Department of Hospital Medicine   [facility]

## 2017-02-28 NOTE — PROGRESS NOTES
Speech is at the bedside to do swallow evaluation. She recommends, mechanical soft diet. Will notify Dr. Matthew.

## 2017-02-28 NOTE — ASSESSMENT & PLAN NOTE
- Aspiration reported by the family  - CXR and CT chest does not show evidence of infiltrates  - CT abdomen/pelvis unrevealing

## 2017-02-28 NOTE — PT/OT/SLP EVAL
Speech Language Pathology  Evaluation    Radha Morales   MRN: [id number]   Admitting Diagnosis: Sepsis    Diet recommendations: Solid Diet Level: Mechanical soft  Liquid Diet Level: Thin Feed only when awake/alert, HOB to 90 degrees, Small bites/sips, Alternating bites/sips, 1 bite/sip at a time, Check for pocketing/oral residue and Remain upright 30 minutes post meal    SLP Treatment Date: 17  Speech Start Time: 0950     Speech Stop Time: 1013     Speech Total (min): 23 min       TREATMENT BILLABLE MINUTES:  Eval Swallow and Oral Function 23    Diagnosis: Sepsis    Past Medical History:   Diagnosis Date    Alzheimer disease     Anemia     Carotid artery occlusion     Chronic kidney disease (CKD), stage III (moderate)     Coronary artery disease     HTN (hypertension)     Hyperlipidemia, acquired     Memory loss     Alzheimer's Disease    Obesity     Obesity (BMI 30.0-34.9) 2015    Osteoarthritis of multiple joints 2016    Pure hypercholesterolemia     UTI (lower urinary tract infection) 14     Past Surgical History:   Procedure Laterality Date    CATARACT EXTRACTION W/  INTRAOCULAR LENS IMPLANT Bilateral     gallstones  2000s       Has the patient been evaluated by SLP for swallowing? : Yes  Keep patient NPO?: No   General Precautions: Standard,      Current Respiratory Status:  (no O2)     Social Hx: Patient lives with family and has 24 hour assistance.    Prior diet: dental soft.    Occupational/hobbies/homemaking: n/a.    Subjective:  Pt was seen at bedside with her 2 daughters present.    Patient goals: pt did not state.    Pain Ratin/10              Pain Rating Post-Intervention: 0/10    Objective:   Patient found with: telemetry, peripheral IV    Oral Musculature Evaluation  Oral Musculature: unable to assess due to poor participation/comprehension     Bedside Swallow Eval:  Consistencies Assessed: thin liquids, puree, and soft solids  Oral Phase: WFL  Pharyngeal  Phase: pt's family reported that pt will pocket some solids at home, no overt clinical  signs/symptoms of aspiration and no overt clinical signs/symptoms of pharyngeal dysphagia         Assessment:  Radha Morales is a 85 y.o. female with a medical diagnosis of Sepsis and presents with a normal aging swallow.  Pt's family is well educated on safe swallow strategies and recommended diet textures.  No further ST warranted.        Discharge recommendations: Discharge Facility/Level Of Care Needs: home (pt has 24 supervision )     Plan:   Plan of Care reviewed with: patient, family  SLP Follow-up?: No              Minerva Tatum CCC-SLP  02/28/2017

## 2017-02-28 NOTE — SUBJECTIVE & OBJECTIVE
Interval History:  No acute complaints.  Difficulty remembering events leading up to admission.    Review of Systems   Constitutional: Negative for chills and fever.   HENT: Negative for congestion and sore throat.    Eyes: Negative for visual disturbance.   Respiratory: Negative for cough, shortness of breath and wheezing.    Cardiovascular: Negative for chest pain, palpitations and leg swelling.   Gastrointestinal: Negative for abdominal pain, blood in stool, constipation, diarrhea, nausea and vomiting.   Genitourinary: Negative for dysuria and hematuria.   Musculoskeletal: Negative for arthralgias and back pain.   Skin: Negative for rash and wound.   Neurological: Negative for dizziness, weakness, light-headedness and numbness.   Hematological: Negative for adenopathy.     Objective:     Vital Signs (Most Recent):  Temp: 98.7 °F (37.1 °C) (02/27/17 1836)  Pulse: 99 (02/27/17 1200)  Resp: 20 (02/27/17 1836)  BP: 134/87 (02/27/17 1836)  SpO2: 97 % (02/27/17 1836) Vital Signs (24h Range):  Temp:  [97.6 °F (36.4 °C)-102.2 °F (39 °C)] 98.7 °F (37.1 °C)  Pulse:  [] 99  Resp:  [18-28] 20  SpO2:  [75 %-97 %] 97 %  BP: (117-201)/() 134/87     Weight: 69.9 kg (154 lb)  Body mass index is 28.17 kg/(m^2).    Intake/Output Summary (Last 24 hours) at 02/27/17 2145  Last data filed at 02/27/17 1555   Gross per 24 hour   Intake          1191.25 ml   Output                0 ml   Net          1191.25 ml      Physical Exam   Constitutional: She appears well-developed and well-nourished. No distress.   HENT:   Head: Normocephalic and atraumatic.   Mouth/Throat: Oropharynx is clear and moist.   Eyes: Conjunctivae and EOM are normal. Pupils are equal, round, and reactive to light.   Neck: Neck supple. No JVD present. No thyromegaly present.   Cardiovascular: Normal rate and regular rhythm.  Exam reveals no gallop and no friction rub.    No murmur heard.  Pulmonary/Chest: Effort normal and breath sounds normal. She has no  wheezes. She has no rales.   Abdominal: Soft. Bowel sounds are normal. She exhibits no distension. There is no tenderness. There is no rebound and no guarding.   Musculoskeletal: Normal range of motion. She exhibits no edema or deformity.   Lymphadenopathy:     She has no cervical adenopathy.   Neurological: She is alert. She has normal reflexes.   Skin: Skin is warm and dry. No rash noted.   Nursing note and vitals reviewed.      Significant Labs:   CBC:   Recent Labs  Lab 02/26/17 2010 02/27/17  0403   WBC 10.45 10.39   HGB 12.0 11.8*   HCT 34.9* 34.7*   * 79*     CMP:   Recent Labs  Lab 02/26/17 2010 02/27/17  0403    139   K 3.0* 3.3*   CL 99 107   CO2 27 23   * 151*   BUN 8 7*   CREATININE 0.7 0.7   CALCIUM 9.5 8.6*   PROT 7.9 7.1   ALBUMIN 3.5 3.1*   BILITOT 1.3* 1.3*   ALKPHOS 99 93   AST 28 26   ALT 23 21   ANIONGAP 10 9   EGFRNONAA >60 >60       Significant Imaging: I have reviewed all pertinent imaging results/findings within the past 24 hours.

## 2017-02-28 NOTE — ASSESSMENT & PLAN NOTE
- Unclear source yet.  Possible viral URI - Flu negative.  Add Tamiflu empirically.  - F/u blood and urine cultures  - Continue IV fluids  - IV antibiotics (Vanc and Cefepime empirically)

## 2017-02-28 NOTE — PLAN OF CARE
Problem: Patient Care Overview  Goal: Plan of Care Review  Outcome: Ongoing (interventions implemented as appropriate)  Pt nonverbal. Family at the bedside to communicate with. D5 NS infusing at 75. Tolerated night medications well crushed in applesauce. Pt NSR on monitor. Had a large pasty bowel movement.  Remained free of falls.  Left pt bed low, call light in reach, bed alarm on

## 2017-03-01 VITALS
BODY MASS INDEX: 29.08 KG/M2 | RESPIRATION RATE: 20 BRPM | OXYGEN SATURATION: 98 % | HEIGHT: 62 IN | WEIGHT: 158 LBS | TEMPERATURE: 98 F | SYSTOLIC BLOOD PRESSURE: 128 MMHG | HEART RATE: 92 BPM | DIASTOLIC BLOOD PRESSURE: 63 MMHG

## 2017-03-01 LAB
ALBUMIN SERPL BCP-MCNC: 2.7 G/DL
ALP SERPL-CCNC: 88 U/L
ALT SERPL W/O P-5'-P-CCNC: 26 U/L
ANION GAP SERPL CALC-SCNC: 8 MMOL/L
AST SERPL-CCNC: 28 U/L
BASOPHILS # BLD AUTO: 0.02 K/UL
BASOPHILS NFR BLD: 0.3 %
BILIRUB SERPL-MCNC: 1.1 MG/DL
BUN SERPL-MCNC: 9 MG/DL
CALCIUM SERPL-MCNC: 9 MG/DL
CHLORIDE SERPL-SCNC: 107 MMOL/L
CO2 SERPL-SCNC: 26 MMOL/L
CREAT SERPL-MCNC: 0.7 MG/DL
DIFFERENTIAL METHOD: ABNORMAL
EOSINOPHIL # BLD AUTO: 0.4 K/UL
EOSINOPHIL NFR BLD: 4.9 %
ERYTHROCYTE [DISTWIDTH] IN BLOOD BY AUTOMATED COUNT: 13.3 %
EST. GFR  (AFRICAN AMERICAN): >60 ML/MIN/1.73 M^2
EST. GFR  (NON AFRICAN AMERICAN): >60 ML/MIN/1.73 M^2
GLUCOSE SERPL-MCNC: 111 MG/DL
HCT VFR BLD AUTO: 28.9 %
HGB BLD-MCNC: 9.7 G/DL
LYMPHOCYTES # BLD AUTO: 1.6 K/UL
LYMPHOCYTES NFR BLD: 21.8 %
MCH RBC QN AUTO: 26.3 PG
MCHC RBC AUTO-ENTMCNC: 33.6 %
MCV RBC AUTO: 78 FL
MONOCYTES # BLD AUTO: 1 K/UL
MONOCYTES NFR BLD: 13.3 %
NEUTROPHILS # BLD AUTO: 4.4 K/UL
NEUTROPHILS NFR BLD: 59.7 %
PLATELET # BLD AUTO: 106 K/UL
PMV BLD AUTO: 11.5 FL
POTASSIUM SERPL-SCNC: 3.4 MMOL/L
PROT SERPL-MCNC: 6.6 G/DL
RBC # BLD AUTO: 3.69 M/UL
SODIUM SERPL-SCNC: 141 MMOL/L
VANCOMYCIN TROUGH SERPL-MCNC: 7.7 UG/ML
WBC # BLD AUTO: 7.35 K/UL

## 2017-03-01 PROCEDURE — G8979 MOBILITY GOAL STATUS: HCPCS | Mod: CN

## 2017-03-01 PROCEDURE — 25000003 PHARM REV CODE 250: Performed by: INTERNAL MEDICINE

## 2017-03-01 PROCEDURE — 97167 OT EVAL HIGH COMPLEX 60 MIN: CPT

## 2017-03-01 PROCEDURE — 36415 COLL VENOUS BLD VENIPUNCTURE: CPT

## 2017-03-01 PROCEDURE — 80053 COMPREHEN METABOLIC PANEL: CPT

## 2017-03-01 PROCEDURE — 97162 PT EVAL MOD COMPLEX 30 MIN: CPT

## 2017-03-01 PROCEDURE — 63600175 PHARM REV CODE 636 W HCPCS: Performed by: EMERGENCY MEDICINE

## 2017-03-01 PROCEDURE — 63600175 PHARM REV CODE 636 W HCPCS: Performed by: INTERNAL MEDICINE

## 2017-03-01 PROCEDURE — G8988 SELF CARE GOAL STATUS: HCPCS | Mod: CN

## 2017-03-01 PROCEDURE — G8987 SELF CARE CURRENT STATUS: HCPCS | Mod: CN

## 2017-03-01 PROCEDURE — G8989 SELF CARE D/C STATUS: HCPCS | Mod: CN

## 2017-03-01 PROCEDURE — C9113 INJ PANTOPRAZOLE SODIUM, VIA: HCPCS | Performed by: EMERGENCY MEDICINE

## 2017-03-01 PROCEDURE — 85025 COMPLETE CBC W/AUTO DIFF WBC: CPT

## 2017-03-01 PROCEDURE — G8978 MOBILITY CURRENT STATUS: HCPCS | Mod: CN

## 2017-03-01 PROCEDURE — 97530 THERAPEUTIC ACTIVITIES: CPT

## 2017-03-01 PROCEDURE — G8980 MOBILITY D/C STATUS: HCPCS | Mod: CN

## 2017-03-01 RX ORDER — LEVOFLOXACIN 500 MG/1
500 TABLET, FILM COATED ORAL DAILY
Qty: 7 TABLET | Refills: 0 | Status: SHIPPED | OUTPATIENT
Start: 2017-03-01 | End: 2017-03-08

## 2017-03-01 RX ADMIN — VANCOMYCIN HYDROCHLORIDE 1000 MG: 1 INJECTION, POWDER, LYOPHILIZED, FOR SOLUTION INTRAVENOUS at 12:03

## 2017-03-01 RX ADMIN — OSELTAMIVIR PHOSPHATE 75 MG: 75 CAPSULE ORAL at 09:03

## 2017-03-01 RX ADMIN — POTASSIUM & SODIUM PHOSPHATES POWDER PACK 280-160-250 MG 1 PACKET: 280-160-250 PACK at 07:03

## 2017-03-01 RX ADMIN — PANTOPRAZOLE SODIUM 40 MG: 40 INJECTION, POWDER, FOR SOLUTION INTRAVENOUS at 09:03

## 2017-03-01 RX ADMIN — MEMANTINE HYDROCHLORIDE 10 MG: 10 TABLET ORAL at 09:03

## 2017-03-01 RX ADMIN — DEXTROSE MONOHYDRATE, SODIUM CHLORIDE, AND POTASSIUM CHLORIDE: 50; 4.5; 1.49 INJECTION, SOLUTION INTRAVENOUS at 12:03

## 2017-03-01 RX ADMIN — METOPROLOL TARTRATE 50 MG: 50 TABLET ORAL at 09:03

## 2017-03-01 RX ADMIN — FLUTICASONE PROPIONATE 1 SPRAY: 50 SPRAY, METERED NASAL at 09:03

## 2017-03-01 NOTE — DISCHARGE SUMMARY
Discharge Summary  Hospital Medicine    Admit Date: 2/26/2017    Discharge Date: 03/01/2017    Discharge Attending Physician: Nicole Crenshaw MD     Diagnoses:  Active Hospital Problems    Diagnosis  POA    *Sepsis [A41.9]  Yes    Aspiration into airway [T17.908A]  Yes    Essential hypertension [I10]  Yes     Chronic    Late onset Alzheimer's disease without behavioral disturbance [G30.1, F02.80]  Yes    CAD (coronary artery disease) [I25.10]  Yes      Resolved Hospital Problems    Diagnosis Date Resolved POA   No resolved problems to display.       Hospital Course:     Ms. Morales is an elderly demented AA female with h/o HTN, CAD, lives at home, was brought in by her family saying that the patient is SOB  She was found to have fever of  102.1 .Empiric antibiotics and Tamiflu  were implemented.  Cultures -NGTD and FLU swab neg- fever resolved and sortness of breath improved  This patient was seen and examined on the date of discharge and determined to be suitable for discharge.    Core Measures:   1.) CHF (Echo, ACEI/ ARB, EF %, smoking cessation): LV assessment done and no left ventricular dysfunction noted.   2.) ACS (ASA, beta-blocker, LDL, Statin, smoking cessation): No ACS during this admission.   3.) TIA/CVA (rehab evaluation, lipids, anti-platelet, Statin): No TIA or CVA during this admission.   4.) Pneumonia (pulse ocimetry, appropriate antibiotic choice, blood cultures, smoking cessation, pneumococcal and Flu vacinations): No pneumonia during this admission.         Discharged Condition :Good    Disposition: Home with home health     Follow-up Plans:   Follow-up Information     Follow up with [facility].    Specialty:  Home Health Services    Why:  Home Health    Contact information:    [address], [address]  [facility] [address] [phone]            Recent Labs:  Recent Results (from the past 336 hour(s))   CBC auto differential    Collection Time: 03/01/17  6:41  "AM   Result Value Ref Range    WBC 7.35 3.90 - 12.70 K/uL    Hemoglobin 9.7 (L) 12.0 - 16.0 g/dL    Hematocrit 28.9 (L) 37.0 - 48.5 %    Platelets 106 (L) 150 - 350 K/uL   CBC auto differential    Collection Time: 02/28/17  4:42 AM   Result Value Ref Range    WBC 9.72 3.90 - 12.70 K/uL    Hemoglobin 10.6 (L) 12.0 - 16.0 g/dL    Hematocrit 30.9 (L) 37.0 - 48.5 %    Platelets 104 (L) 150 - 350 K/uL   CBC auto differential    Collection Time: 02/27/17  4:03 AM   Result Value Ref Range    WBC 10.39 3.90 - 12.70 K/uL    Hemoglobin 11.8 (L) 12.0 - 16.0 g/dL    Hematocrit 34.7 (L) 37.0 - 48.5 %    Platelets 79 (L) 150 - 350 K/uL     No results found for this or any previous visit (from the past 336 hour(s)).  Lab Results   Component Value Date    HGBA1C 4.9 02/27/2017       Discharge Medication List:   Radha Morales   Home Medication Instructions MIRYAM:[id number]    Printed on:03/01/17 1104   Medication Information                      aspirin (ASPIRIN CHILDRENS) 81 MG Chew  Take by mouth. 1 Tablet, Chewable Oral Every day             cholecalciferol, vitamin D3, 2,000 unit Cap  Take 1 capsule (2,000 Units total) by mouth once daily.             fluticasone (FLONASE) 50 mcg/actuation nasal spray  1 spray by Each Nare route once daily.             furosemide (LASIX) 20 MG tablet  TAKE 1 TABLET BY MOUTH ONCE A DAY             memantine (NAMENDA) 10 MG Tab  TAKE ONE TABLET BY MOUTH TWICE DAILY             metoprolol tartrate (LOPRESSOR) 50 MG tablet  TAKE ONE TABLET BY MOUTH TWICE DAILY             quetiapine (SEROQUEL) 50 MG tablet  TAKE 1 OR 2 TABLETS BY MOUTH AT BEDTIME                     Discharge Procedure Orders  WHEELCHAIR FOR HOME USE   Order Specific Question Answer Comments   Hours in W/C per day: 16    Type of Wheelchair: Standard    Size(Width): 18"(STD adult)    Leg Support: STD footrests    Lap Belt: Velcro    Cushion: Foam    Height: 5' 2" (1.575 m)    Weight: 69.9 kg (154 lb)    Does patient have " medical equipment at home? cane, straight    Does patient have medical equipment at home? shower chair    Length of need (1-99 months): 99    Please check all that apply: Caregiver is capable and willing to operate wheelchair safely.    Please check all that apply: The patient requires the use of a w/c for activities of daily living within the Home.    Please check all that apply: Patient mobility limitations cannot be sufficiently resolved by the use of other ambulatory therapies.      Referral to Home health   Referral Priority: Routine Referral Type: Home Health   Referral Reason: Specialty Services Required    Requested Specialty: Home Health Services    Number of Visits Requested: 1          An excess of 30 minutes was spent discharging this patient.

## 2017-03-01 NOTE — PLAN OF CARE
03/01/17 1144   Final Note   Assessment Type Final Discharge Note   Discharge Disposition Home-Health  ([facility])

## 2017-03-01 NOTE — PROGRESS NOTES
[facility] Hospital Medicine  Progress Note    Patient Name: Radha Morales  MRN: [id number]  Patient Class: IP- Inpatient   Admission Date: 2/26/2017  Length of Stay: 2 days  Attending Physician: Sam Matthew MD  Primary Care Provider: Ashleigh Escalante MD        Subjective:     Principal Problem:Sepsis    HPI:  Ms. Morales is an elderly demented AA female with h/o HTN, CAD, lives at home, was brought in by her family saying that the patient is SOB and not acting right for the past 2 days. Reported to have high fever of 102.1 at home, uncomfortable. Patient is demented, cannot obtain history. Discussed with son and daughter at the bed side. Temp 102.1, , RR 24. WBC normal. CXR does not show infiltrates. CT chest does not show active infiltrates.    Hospital Course:       Interval History:  No acute complaints.  Near baseline per her daughter.    Review of Systems   Constitutional: Negative for chills and fever.   HENT: Negative for congestion and sore throat.    Eyes: Negative for visual disturbance.   Respiratory: Negative for cough, shortness of breath and wheezing.    Cardiovascular: Negative for chest pain, palpitations and leg swelling.   Gastrointestinal: Negative for abdominal pain, blood in stool, constipation, diarrhea, nausea and vomiting.   Genitourinary: Negative for dysuria and hematuria.   Musculoskeletal: Negative for arthralgias and back pain.   Skin: Negative for rash and wound.   Neurological: Negative for dizziness, weakness, light-headedness and numbness.   Hematological: Negative for adenopathy.     Objective:     Vital Signs (Most Recent):  Temp: 99.8 °F (37.7 °C) (02/28/17 1900)  Pulse: 97 (02/28/17 1900)  Resp: 18 (02/28/17 1900)  BP: (!) 127/94 (02/28/17 1900)  SpO2: 100 % (02/28/17 1900) Vital Signs (24h Range):  Temp:  [98.4 °F (36.9 °C)-99.8 °F (37.7 °C)] 99.8 °F (37.7 °C)  Pulse:  [83-97] 97  Resp:  [18-21] 18  SpO2:  [94 %-100 %] 100 %  BP:  (127-135)/(76-94) 127/94     Weight: 69.9 kg (154 lb)  Body mass index is 28.17 kg/(m^2).    Intake/Output Summary (Last 24 hours) at 02/28/17 2206  Last data filed at 02/28/17 0000   Gross per 24 hour   Intake           726.25 ml   Output                0 ml   Net           726.25 ml      Physical Exam   Constitutional: She appears well-developed and well-nourished. No distress.   HENT:   Head: Normocephalic and atraumatic.   Mouth/Throat: Oropharynx is clear and moist.   Eyes: Conjunctivae and EOM are normal. Pupils are equal, round, and reactive to light.   Neck: Neck supple. No JVD present. No thyromegaly present.   Cardiovascular: Normal rate and regular rhythm.  Exam reveals no gallop and no friction rub.    No murmur heard.  Pulmonary/Chest: Effort normal and breath sounds normal. She has no wheezes. She has no rales.   Abdominal: Soft. Bowel sounds are normal. She exhibits no distension. There is no tenderness. There is no rebound and no guarding.   Musculoskeletal: Normal range of motion. She exhibits no edema or deformity.   Lymphadenopathy:     She has no cervical adenopathy.   Neurological: She is alert. She has normal reflexes.   Skin: Skin is warm and dry. No rash noted.   Nursing note and vitals reviewed.      Significant Labs:   CBC:     Recent Labs  Lab 02/27/17  0403 02/28/17  0442   WBC 10.39 9.72   HGB 11.8* 10.6*   HCT 34.7* 30.9*   PLT 79* 104*     CMP:     Recent Labs  Lab 02/27/17  0403 02/28/17  0442    138   K 3.3* 3.1*    104   CO2 23 25   * 130*   BUN 7* 7*   CREATININE 0.7 0.7   CALCIUM 8.6* 8.9   PROT 7.1 6.9   ALBUMIN 3.1* 3.0*   BILITOT 1.3* 1.5*   ALKPHOS 93 91   AST 26 26   ALT 21 22   ANIONGAP 9 9   EGFRNONAA >60 >60       Significant Imaging: I have reviewed all pertinent imaging results/findings within the past 24 hours.    Assessment/Plan:      * Sepsis  - No definitive source by workup.  Possible viral URI - Flu negative.  Add Tamiflu empirically.  - F/u  blood and urine cultures.  Clinically improving and near baseline.    CAD (coronary artery disease)  - Resume home medications    Late onset Alzheimer's disease without behavioral disturbance  - Unable to obtain any history due to advanced dementia.  PT/OT evaluation pending.  Anticipate return home with home health and wheelchair.    Essential hypertension  - Elevated, resume home medications  - Hydralazine IV prn    Aspiration into airway  - Aspiration reported by the family  - CXR and CT chest does not show evidence of infiltrates  - CT abdomen/pelvis unrevealing  Mechanical soft per SLP.    VTE Risk Mitigation         Ordered     Medium Risk of VTE  Once      02/27/17 0133     Place MARY hose  Until discontinued      02/27/17 0133     Place sequential compression device  Until discontinued      02/27/17 0133     Reason for No Pharmacological VTE Prophylaxis  Once      02/27/17 0133          Sam Matthew MD  Department of Hospital Medicine   [facility]

## 2017-03-01 NOTE — PLAN OF CARE
Problem: Patient Care Overview  Goal: Plan of Care Review  Outcome: Ongoing (interventions implemented as appropriate)  Pt continue on antibiotics, continue fluids with K 20 mEq d/t low K level, K+ supplements by mouth, family involved in care, Speech evaluation noted pt can tolerate gentle mechanical soft diet, 90 degree angle while eating, assistance needed with ADLs.

## 2017-03-01 NOTE — SUBJECTIVE & OBJECTIVE
Interval History:  No acute complaints.  Near baseline per her daughter.    Review of Systems   Constitutional: Negative for chills and fever.   HENT: Negative for congestion and sore throat.    Eyes: Negative for visual disturbance.   Respiratory: Negative for cough, shortness of breath and wheezing.    Cardiovascular: Negative for chest pain, palpitations and leg swelling.   Gastrointestinal: Negative for abdominal pain, blood in stool, constipation, diarrhea, nausea and vomiting.   Genitourinary: Negative for dysuria and hematuria.   Musculoskeletal: Negative for arthralgias and back pain.   Skin: Negative for rash and wound.   Neurological: Negative for dizziness, weakness, light-headedness and numbness.   Hematological: Negative for adenopathy.     Objective:     Vital Signs (Most Recent):  Temp: 99.8 °F (37.7 °C) (02/28/17 1900)  Pulse: 97 (02/28/17 1900)  Resp: 18 (02/28/17 1900)  BP: (!) 127/94 (02/28/17 1900)  SpO2: 100 % (02/28/17 1900) Vital Signs (24h Range):  Temp:  [98.4 °F (36.9 °C)-99.8 °F (37.7 °C)] 99.8 °F (37.7 °C)  Pulse:  [83-97] 97  Resp:  [18-21] 18  SpO2:  [94 %-100 %] 100 %  BP: (127-135)/(76-94) 127/94     Weight: 69.9 kg (154 lb)  Body mass index is 28.17 kg/(m^2).    Intake/Output Summary (Last 24 hours) at 02/28/17 2206  Last data filed at 02/28/17 0000   Gross per 24 hour   Intake           726.25 ml   Output                0 ml   Net           726.25 ml      Physical Exam   Constitutional: She appears well-developed and well-nourished. No distress.   HENT:   Head: Normocephalic and atraumatic.   Mouth/Throat: Oropharynx is clear and moist.   Eyes: Conjunctivae and EOM are normal. Pupils are equal, round, and reactive to light.   Neck: Neck supple. No JVD present. No thyromegaly present.   Cardiovascular: Normal rate and regular rhythm.  Exam reveals no gallop and no friction rub.    No murmur heard.  Pulmonary/Chest: Effort normal and breath sounds normal. She has no wheezes. She has  no rales.   Abdominal: Soft. Bowel sounds are normal. She exhibits no distension. There is no tenderness. There is no rebound and no guarding.   Musculoskeletal: Normal range of motion. She exhibits no edema or deformity.   Lymphadenopathy:     She has no cervical adenopathy.   Neurological: She is alert. She has normal reflexes.   Skin: Skin is warm and dry. No rash noted.   Nursing note and vitals reviewed.      Significant Labs:   CBC:     Recent Labs  Lab 02/27/17  0403 02/28/17  0442   WBC 10.39 9.72   HGB 11.8* 10.6*   HCT 34.7* 30.9*   PLT 79* 104*     CMP:     Recent Labs  Lab 02/27/17  0403 02/28/17  0442    138   K 3.3* 3.1*    104   CO2 23 25   * 130*   BUN 7* 7*   CREATININE 0.7 0.7   CALCIUM 8.6* 8.9   PROT 7.1 6.9   ALBUMIN 3.1* 3.0*   BILITOT 1.3* 1.5*   ALKPHOS 93 91   AST 26 26   ALT 21 22   ANIONGAP 9 9   EGFRNONAA >60 >60       Significant Imaging: I have reviewed all pertinent imaging results/findings within the past 24 hours.

## 2017-03-01 NOTE — ASSESSMENT & PLAN NOTE
- Unable to obtain any history due to advanced dementia.  PT/OT evaluation pending.  Anticipate return home with home health and wheelchair.

## 2017-03-01 NOTE — PLAN OF CARE
Problem: Patient Care Overview  Goal: Plan of Care Review  Outcome: Ongoing (interventions implemented as appropriate)  D5 1/2 NS with 20MEQ of potassium infusing at 75ml/hr. Pt nonverbal. Responds to pain and movement.  Pt NSR on monitor. 2L NC. Tolerated meds well crushed. Incontinent to bowel and bladder. Family at bedside. Remained free of falls.  Left pt bed low, call light in reach, bed alarm on; door open.

## 2017-03-01 NOTE — ASSESSMENT & PLAN NOTE
- No definitive source by workup.  Possible viral URI - Flu negative.  Add Tamiflu empirically.  - F/u blood and urine cultures.  Clinically improving and near baseline.

## 2017-03-01 NOTE — PT/OT/SLP EVAL
Physical Therapy  Evaluation    Radha Morales   MRN: [id number]   Admitting Diagnosis: Sepsis    PT Received On: 17  PT Start Time: 906     PT Stop Time: 915    PT Total Time (min): 9 min       Billable Minutes:  Evaluation 9    Diagnosis: Sepsis      Past Medical History:   Diagnosis Date    Alzheimer disease     Anemia     Carotid artery occlusion     Chronic kidney disease (CKD), stage III (moderate)     Coronary artery disease     HTN (hypertension)     Hyperlipidemia, acquired     Memory loss     Alzheimer's Disease    Obesity     Obesity (BMI 30.0-34.9) 2015    Osteoarthritis of multiple joints 2016    Pure hypercholesterolemia     UTI (lower urinary tract infection) 14      Past Surgical History:   Procedure Laterality Date    CATARACT EXTRACTION W/  INTRAOCULAR LENS IMPLANT Bilateral     gallstones           Date referred to PT: 3/1/2017      General Precautions: Standard, fall  Orthopedic Precautions:     Braces:              Patient History:  Lives With: child(antonio), adult  Living Arrangements: house  Stair Railings at Home: none  Transportation Available: family or friend will provide  Living Environment Comment: PT IS TOTAL CARE AT HOME WITH SITTERS 6 DAYS A WEEK X 8 HOURS  DME owned (not currently used): hospital bed    Previous Level of Function:  Ambulation Skills: needs device and assist  Transfer Skills: needs device and assist  ADL Skills: needs device and assist    Subjective:  Communicated with NURSE AND EPIC CHART REVIEW  prior to session.   PT AND FAMILY AGREED TO EVAL AND TX   Chief Complaint: NONE   Patient goals: NONE    Pain Ratin/10               Pain Rating Post-Intervention: 0/10    Objective:   Patient found with: telemetry, peripheral IV     Cognitive Exam:  Oriented to: DISORIENTED X 4      Follows Commands/attention: Inattentive  Communication: NON-VERBAL  Safety awareness/insight to disability: impaired    Physical Exam:  Postural  examination/scapula alignment: Rounded shoulder, Head forward and Abnormal trunk flexion    Skin integrity: Visible skin intact  Edema: None noted     Lower Extremity Range of Motion:  Right Lower Extremity: LIMITED  Left Lower Extremity: LIMITED    Lower Extremity Strength:  Right Lower Extremity: LIMITED  Left Lower Extremity: LIMITED       Functional Mobility:  Bed Mobility:  Rolling/Turning to Left: Total assistance, With assist of 2  Supine to Sit: Total Assistance, With assist of 2  Sit to Supine: Total Assistance, With assist of  2    Transfers:       Balance:   Static Sit: POOR: Needs MODERATE assist to maintain  Dynamic Sit: 0: N/A      Therapeutic Activities and Exercises:  PT REQUIRES TOTAL CARE AS PLOF. PT WILL BE D/C TO MOBILITY PROGRAM FOR ROM    AM-PAC 6 CLICK MOBILITY  How much help from another person does this patient currently need?   1 = Unable, Total/Dependent Assistance  2 = A lot, Maximum/Moderate Assistance  3 = A little, Minimum/Contact Guard/Supervision  4 = None, Modified [place]/Independent          AM-PAC Raw Score CMS G-Code Modifier Level of Impairment Assistance   6 % Total / Unable   7 - 9 CM 80 - 100% Maximal Assist   10 - 14 CL 60 - 80% Moderate Assist   15 - 19 CK 40 - 60% Moderate Assist   20 - 22 CJ 20 - 40% Minimal Assist   23 CI 1-20% SBA / CGA   24 CH 0% Independent/ Mod I     Patient left supine with call button in reach.    Assessment:   Radha Morales is a 85 y.o. female with a medical diagnosis of Sepsis and presents with GROSS FUNC DEFICITS AND REQUIRES TOTAL CARE PT WILL BE D/C TO MOBILITY PROGRAM.    Rehab identified problem list/impairments: Rehab identified problem list/impairments: weakness, impaired functional mobilty, impaired balance, impaired endurance, impaired self care skills, decreased ROM, decreased lower extremity function, decreased upper extremity function, impaired cognition      Discharge recommendations: Discharge Facility/Level Of  Care Needs: home (24 HOUR CARE)     Barriers to discharge:      Equipment recommendations: Equipment Needed After Discharge: none   PLAN:    Patient to be seen   to address the above listed problems via    Plan of Care expires:    Plan of Care reviewed with: patient    Functional Assessment Tool Used: BOSTON LEX PAC  Score: CN  Functional Limitation: Mobility: Walking and moving around  Mobility: Walking and Moving Around Current Status (): CN  Mobility: Walking and Moving Around Goal Status (): CN  Mobility: Walking and Moving Around Discharge Status (): CHELSEA Farmer, PT  03/01/2017

## 2017-03-01 NOTE — ASSESSMENT & PLAN NOTE
- Aspiration reported by the family  - CXR and CT chest does not show evidence of infiltrates  - CT abdomen/pelvis unrevealing  Mechanical soft per SLP.

## 2017-03-02 NOTE — PT/OT/SLP EVAL
Occupational Therapy  Evaluation    Radha Morales   MRN: [id number]   Admitting Diagnosis: Sepsis    OT Date of Treatment: 17   OT Start Time: 830  OT Stop Time: 855  OT Total Time (min): 25 min    Billable Minutes:  Evaluation 15 MINUTES  Therapeutic Activity 10 MINUTES    Diagnosis: Sepsis       Past Medical History:   Diagnosis Date    Alzheimer disease     Anemia     Carotid artery occlusion     Chronic kidney disease (CKD), stage III (moderate)     Coronary artery disease     HTN (hypertension)     Hyperlipidemia, acquired     Memory loss     Alzheimer's Disease    Obesity     Obesity (BMI 30.0-34.9) 2015    Osteoarthritis of multiple joints 2016    Pure hypercholesterolemia     UTI (lower urinary tract infection) 14      Past Surgical History:   Procedure Laterality Date    CATARACT EXTRACTION W/  INTRAOCULAR LENS IMPLANT Bilateral     gallstones           Date referred to OT: 17    General Precautions: Standard, fall  Orthopedic Precautions: N/A  Braces:            Patient History:  Living Environment  Lives With: child(antonio), adult  Living Arrangements: house  Living Environment Comment: PT REQ TOTAL CARE PRIOR TO ADMISSION WITH SITTER 6 WEEKS X 8 HOURS    Prior level of function:   Bed Mobility/Transfers: needs device and assist  Grooming: needs assist  Bathing: needs device and assist  Upper Body Dressing: needs assist  Lower Body Dressing: needs assist  Toileting: needs device and assist  Driving License: No     Dominant hand: right    Subjective:  Communicated with NURSE AND EPIC CHART REVIEW prior to session.    Chief Complaint:   Patient/Family stated goals:     Pain Ratin/10                   Objective:  Patient found with: telemetry, peripheral IV    Cognitive Exam:  Oriented to: AOX 0  Follows Commands/attention: NONE  Communication: NON VERBAL TO THERAPIST  Memory:  Poor immediate recall  Safety awareness/insight to disability: impaired  Coping  "skills/emotional control: Despondent    Visual/perceptual:  Intact    Physical Exam:  Postural examination/scapula alignment: Rounded shoulder and Head forward  Skin integrity: Visible skin intact and Thin  Edema: None noted     Sensation:   Intact    Upper Extremity Range of Motion:  Right Upper Extremity: AAROM WFL  Left Upper Extremity: AAROM WFL    Upper Extremity Strength:  Right Upper Extremity: MMT: 2/5 GROSSLY  Left Upper Extremity: MMT: 2/5 GROSSLY   Strength: MMT: 2/5 GROSSLY    Fine motor coordination:   UNABLE TO ASSESS    Gross motor coordination: WFL    Functional Mobility:  Bed Mobility:  Rolling/Turning to Left: Total assistance  Rolling/Turning Right: Total assistance  Scooting/Bridging: Total Assistance  Supine to Sit: Total Assistance  Sit to Supine: Total Assistance    Transfers:       Functional Ambulation: NA      Activities of Daily Living:     Feeding adaptive equipment: NA  UE Dressing Level of Assistance: Total assistance  UE adaptive equipment: NA  LE Dressing Level of Assistance: Total assistance  LE adaptive equipment: NA     Grooming Level of Assistance: Total assistance  Toileting Where Assessed: Bed level  Toileting Level of Assistance: Total assistance        Bathing adaptive equipment: NA    Balance:   Static Sit: POOR: Needs MODERATE assist to maintain  Dynamic Sit: POOR: N/A  Static Stand: NA  Dynamic stand: NA    Therapeutic Activities and Exercises:      AM-PAC 6 CLICK ADL  How much help from another person does this patient currently need?  1 = Unable, Total/Dependent Assistance  2 = A lot, Maximum/Moderate Assistance  3 = A little, Minimum/Contact Guard/Supervision  4 = None, Modified [place]/Independent         AM-PAC Raw Score CMS "G-Code Modifier Level of Impairment Assistance   6 % Total / Unable   7 - 9 CM 80 - 100% Maximal Assist   10 - 14 CL 60 - 80% Moderate Assist   15 - 19 CK 40 - 60% Moderate Assist   20 - 22 CJ 20 - 40% Minimal Assist   23 CI 1-20% " SBA / CGA   24 CH 0% Independent/ Mod I       Patient left HOB elevated with all lines intact, call button in reach, bed alarm on, NURSE notified and FAMILY present    Assessment:  Radha Morales is a 85 y.o. female with a medical diagnosis of Sepsis and presents with PT REQ TOTAL A WITH ALL ADL'S AND BED MOBILITY.  PT DISCHARGED FROM SKILLED OT AND PLACED ON PEOPLE 'S PROGRAM FOR ROM EXERCISE.    Rehab identified problem list/impairments: Rehab identified problem list/impairments: weakness, impaired functional mobilty, impaired balance, decreased upper extremity function, decreased safety awareness, impaired endurance, impaired self care skills, gait instability, decreased coordination    Rehab potential is poor.    Activity tolerance: Poor    Discharge recommendations: Discharge Facility/Level Of Care Needs: home     Barriers to discharge:      Equipment recommendations: lift device     GOALS:   Occupational Therapy Goals     Not on file          PLAN:  Patient to be seen   to address the above listed problems via    Plan of Care expires:    Plan of Care reviewed with: patient, family         Hortensia Briceño OT  03/01/2017

## 2017-03-03 ENCOUNTER — PATIENT OUTREACH (OUTPATIENT)
Dept: ADMINISTRATIVE | Facility: CLINIC | Age: 82
End: 2017-03-03
Payer: MEDICARE

## 2017-03-03 NOTE — PATIENT INSTRUCTIONS
Understanding Sepsis  Sepsis is a severe response the body has to an infection. It is most often caused by bacteria. It is also known as septicemia, or systemic inflammatory response syndrome (SIRS). Sepsis is a medical emergency. It needs to be treated right away.  What is sepsis?  Sepsis is when the body reacts to an infection with a severe inflammatory response. It can be caused by bacteria, fungus, or a virus. Sepsis can cause many kinds of problems around the body. It can lead severe low blood pressure (shock) and organ failure. This can lead to death if not treated.  Sepsis is most common in:  · Infants and older adults  · People with an infection such as pneumonia, meningitis, or a urinary tract infection  · People who have an illness such as cancer, AIDS, or diabetes  · People being treated with chemotherapy medications or radiation  · People who have had a transplant  Symptoms of sepsis  Symptoms of sepsis can include:  · Chills and shaking  · Rapid heartbeat  · Rapid breathing  · Shortness of breath  · Severe nausea or uncontrolled vomiting  · Confusion  · Dizziness  · Decreased urination  · Severe pain, including in the back or joints   Diagnosing sepsis  If your health care provider thinks you may have sepsis, you will be given tests. You may have blood and urine tests. These are done to look for bacteria, viruses, or fungus. You may also have X-rays or other imaging tests. These may be done to look at your organs to locate the source of infection.  Treating sepsis  If you have sepsis, your health care provider will give you antibiotics through a thin, flexible tube put into a vein in your arm (IV). You will also be given fluids through the IV. You may also be given nutrition or other medications through your IV. Your health care provider will talk with you about other treatments you may need. These may include using an oxygen mask or a ventilator to help with breathing. Treatment may last at least 7  to 10 days. Sepsis must be treated in the hospital.  Date Last Reviewed: 7/15/2015  © 2748-2443 [employer]. [address] [place] [address], [place] [address]. All rights reserved. This information is not intended as a substitute for professional medical care. Always follow your healthcare professional's instructions.

## 2017-03-04 LAB
BACTERIA BLD CULT: NORMAL
BACTERIA BLD CULT: NORMAL

## 2017-03-06 ENCOUNTER — TELEPHONE (OUTPATIENT)
Dept: INTERNAL MEDICINE | Facility: CLINIC | Age: 82
End: 2017-03-06

## 2017-03-06 NOTE — TELEPHONE ENCOUNTER
----- Message from Rosalia Vang sent at 3/6/2017 12:20 PM CST -----  Contact: daughter Andra  Calling to find out if patient can come later about 12:30 PM. States her daughter appointment is about that time @ [facility] also and this is there transportation. Please call Andra @ [phone]. Thanks, myriam

## 2017-03-06 NOTE — TELEPHONE ENCOUNTER
----- Message from Barbara Goldberg sent at 3/6/2017  4:32 PM CST -----  Contact: DaughterSathish  pt needs a return call rg her scheduled appt. ..[phone](home)

## 2017-03-08 ENCOUNTER — OFFICE VISIT (OUTPATIENT)
Dept: INTERNAL MEDICINE | Facility: CLINIC | Age: 82
End: 2017-03-08
Payer: MEDICARE

## 2017-03-08 VITALS
HEART RATE: 118 BPM | OXYGEN SATURATION: 95 % | HEIGHT: 62 IN | DIASTOLIC BLOOD PRESSURE: 74 MMHG | TEMPERATURE: 98 F | SYSTOLIC BLOOD PRESSURE: 128 MMHG

## 2017-03-08 DIAGNOSIS — G30.1 LATE ONSET ALZHEIMER'S DISEASE WITHOUT BEHAVIORAL DISTURBANCE: ICD-10-CM

## 2017-03-08 DIAGNOSIS — F02.80 LATE ONSET ALZHEIMER'S DISEASE WITHOUT BEHAVIORAL DISTURBANCE: ICD-10-CM

## 2017-03-08 DIAGNOSIS — R53.1 WEAKNESS: ICD-10-CM

## 2017-03-08 DIAGNOSIS — I10 ESSENTIAL HYPERTENSION: Primary | Chronic | ICD-10-CM

## 2017-03-08 DIAGNOSIS — B34.9 VIRAL SYNDROME: ICD-10-CM

## 2017-03-08 PROCEDURE — 99499 UNLISTED E&M SERVICE: CPT | Mod: S$GLB,,, | Performed by: INTERNAL MEDICINE

## 2017-03-08 PROCEDURE — 99999 PR PBB SHADOW E&M-EST. PATIENT-LVL III: CPT | Mod: PBBFAC,,, | Performed by: INTERNAL MEDICINE

## 2017-03-08 NOTE — PROGRESS NOTES
Transitional Care Note  Subjective:       Patient ID: Radha Morales is a 85 y.o. female.  Chief Complaint: Hospital Follow Up    Family and/or Caretaker present at visit?  Yes.  Diagnostic tests reviewed/disposition: No diagnosic tests pending after this hospitalization.  Disease/illness education:   Home health/community services discussion/referrals: Patient has home health established at [facility].   Establishment or re-establishment of referral orders for community resources: No other necessary community resources.   Discussion with other health care providers: No discussion with other health care providers necessary.   HPI Comments: Admitted/ICU for sob, presumed viral syndrome.  CXR no infiltrate.  Completed 7d of Levaquin.  Thought poss aspiration risk.  Now back to baseline.  No f/c/n/v.  No cough.  Furosemide was stopped at discharge.    Updated/ annual due 6/17:  HM: 11/16 fluvax, 6/15 [id number], 3/16 booster [id number] with adverse rxn, 10/13 TDaP, 8/11 zostervax.        Review of Systems   Constitutional: Negative for chills, diaphoresis and fever.   Respiratory: Negative for cough and shortness of breath.    Cardiovascular: Negative for chest pain, palpitations and leg swelling.   Gastrointestinal: Negative for blood in stool, constipation, diarrhea, nausea and vomiting.   Genitourinary: Negative for dysuria, frequency and hematuria.   Psychiatric/Behavioral: The patient is not nervous/anxious.        Objective:       Vitals:    03/08/17 1152   BP: 128/74   Pulse: (!) 118   Temp: 97.7 °F (36.5 °C)       Physical Exam   Constitutional: She appears well-developed.   HENT:   Mouth/Throat: Oropharynx is clear and moist.   Neck: Neck supple. Carotid bruit is not present. No thyroid mass present.   Cardiovascular: Normal rate, regular rhythm and intact distal pulses.  Exam reveals no gallop and no friction rub.    No murmur heard.  Pulmonary/Chest: Effort normal and breath sounds normal. She has no  wheezes. She has no rales.   Abdominal: Soft. Bowel sounds are normal. She exhibits no mass. There is no hepatosplenomegaly. There is no tenderness.   Musculoskeletal: She exhibits no edema.   Lymphadenopathy:     She has no cervical adenopathy.   Neurological: She is alert.   Psychiatric: She has a normal mood and affect.       Assessment:       1. Essential hypertension    2. Late onset Alzheimer's disease without behavioral disturbance    3. Viral syndrome    4. Weakness        Plan:       Radha was seen today for hospital follow up.    Diagnoses and all orders for this visit:    Essential hypertension    Late onset Alzheimer's disease without behavioral disturbance    Viral syndrome    Weakness- has HH with PT now, bedside commode ordered.    RTC assched.

## 2017-03-08 NOTE — PROGRESS NOTES
Subjective:       Patient ID: Radha Morales is a 85 y.o. female.    Chief Complaint: No chief complaint on file.    HPI  Review of Systems    Objective:   LMP  (LMP Unknown)    Physical Exam    Assessment:       No diagnosis found.    Plan:       ***

## 2017-03-08 NOTE — MR AVS SNAPSHOT
[facility]  [address] [place] [address]  Phone: [phone]  Fax: [phone]                  Radha Morales   3/8/2017 11:40 AM   Office Visit    Description:  Female : 1931   Provider:  Ashleigh Herrera MD   Department:  [facility]           Reason for Visit     Hospital Follow Up           Diagnoses this Visit        Comments    Essential hypertension    -  Primary     Late onset Alzheimer's disease without behavioral disturbance         Viral syndrome         Weakness                To Do List           Future Appointments        Provider Department Dept Phone    2017 10:00 AM Lilly Butts DPM [facility] [phone]    2017 9:00 AM Ashleigh Herrera MD [facility] [phone]      Goals (5 Years of Data)     None      Follow-Up and Disposition     Return in about 4 months (around 2017).    Follow-up and Disposition History      [facility] On Call     [facility] On Call Nurse [facility] -  Assistance  Registered nurses in the [facility] On Call Center provide clinical advisement, health education, appointment booking, and other advisory services.  Call for this free service at [phone].             Medications           Message regarding Medications     Verify the changes and/or additions to your medication regime listed below are the same as discussed with your clinician today.  If any of these changes or additions are incorrect, please notify your healthcare provider.             Verify that the below list of medications is an accurate representation of the medications you are currently taking.  If none reported, the list may be blank. If incorrect, please contact your healthcare provider. Carry this list with you in case of emergency.           Current Medications     aspirin (ASPIRIN CHILDRENS) 81 MG Chew Take by mouth. 1 Tablet, Chewable Oral Every day    cholecalciferol, vitamin D3, 2,000 unit Cap Take 1  "capsule (2,000 Units total) by mouth once daily.    fluticasone (FLONASE) 50 mcg/actuation nasal spray 1 spray by Each Nare route once daily.    levoFLOXacin (LEVAQUIN) 500 MG tablet Take 1 tablet (500 mg total) by mouth once daily.    memantine (NAMENDA) 10 MG Tab TAKE ONE TABLET BY MOUTH TWICE DAILY    metoprolol tartrate (LOPRESSOR) 50 MG tablet TAKE ONE TABLET BY MOUTH TWICE DAILY    quetiapine (SEROQUEL) 50 MG tablet TAKE 1 OR 2 TABLETS BY MOUTH AT BEDTIME           Clinical Reference Information           Your Vitals Were     BP Pulse Temp Height Last Period SpO2    128/74 (BP Location: Right arm) 118 97.7 °F (36.5 °C) (Tympanic) 5' 2" (1.575 m) (LMP Unknown) 95%      Blood Pressure          Most Recent Value    BP  128/74      Allergies as of 3/8/2017     Penicillins    Propoxyphene N-acetaminophen      Immunizations Administered on Date of Encounter - 3/8/2017     None      [facility] Sign-Up     Activating your [facility] account is as easy as 1-2-3!     1) Visit [email or web address], select Sign Up Now, enter this activation code and your date of birth, then select Next.  [email or web address]  Expires: 3/11/2017 10:52 AM      2) Create a username and password to use when you visit [facility] in the future and select a security question in case you lose your password and select Next.    3) Enter your e-mail address and click Sign Up!    Additional Information  If you have questions, please e-mail [email or web address] or call [phone] to talk to our [facility] staff. Remember, [facility] is NOT to be used for urgent needs. For medical emergencies, dial 911.         Language Assistance Services     ATTENTION: Language assistance services are available, free of charge. Please call [phone].      ATENCIÓN: Si habla español, tiene a castro disposición servicios gratuitos de asistencia lingüística. Llame al [phone].     CHÚ Ý: N?u b?n nói Ti?ng Vi?t, có các d?ch v? h? tr? ngôn ng? mi?n phí dành cho b?n. G?i s? " [phone].         [facility] complies with applicable Federal civil rights laws and does not discriminate on the basis of race, color, national origin, age, disability, or sex.

## 2017-03-30 PROCEDURE — 99495 TRANSJ CARE MGMT MOD F2F 14D: CPT | Mod: S$GLB,,, | Performed by: INTERNAL MEDICINE

## 2017-04-12 DIAGNOSIS — I10 ESSENTIAL HYPERTENSION: ICD-10-CM

## 2017-04-13 RX ORDER — METOPROLOL TARTRATE 50 MG/1
TABLET ORAL
Qty: 60 TABLET | Refills: 6 | Status: SHIPPED | OUTPATIENT
Start: 2017-04-13 | End: 2017-05-16 | Stop reason: SDUPTHER

## 2017-05-16 ENCOUNTER — OFFICE VISIT (OUTPATIENT)
Dept: CARDIOLOGY | Facility: CLINIC | Age: 82
End: 2017-05-16
Payer: MEDICARE

## 2017-05-16 VITALS
SYSTOLIC BLOOD PRESSURE: 140 MMHG | OXYGEN SATURATION: 97 % | HEART RATE: 70 BPM | DIASTOLIC BLOOD PRESSURE: 84 MMHG | HEIGHT: 62 IN

## 2017-05-16 DIAGNOSIS — I47.10 SVT (SUPRAVENTRICULAR TACHYCARDIA): ICD-10-CM

## 2017-05-16 DIAGNOSIS — I70.0 CALCIFICATION OF AORTA: ICD-10-CM

## 2017-05-16 DIAGNOSIS — I77.9 BILATERAL CAROTID ARTERY DISEASE: Primary | ICD-10-CM

## 2017-05-16 DIAGNOSIS — I10 ESSENTIAL HYPERTENSION: ICD-10-CM

## 2017-05-16 DIAGNOSIS — I25.10 CORONARY ARTERY DISEASE DUE TO CALCIFIED CORONARY LESION: ICD-10-CM

## 2017-05-16 DIAGNOSIS — I25.84 CORONARY ARTERY DISEASE DUE TO CALCIFIED CORONARY LESION: ICD-10-CM

## 2017-05-16 DIAGNOSIS — I51.89 LEFT VENTRICULAR DIASTOLIC DYSFUNCTION WITH PRESERVED SYSTOLIC FUNCTION: ICD-10-CM

## 2017-05-16 PROCEDURE — 3077F SYST BP >= 140 MM HG: CPT | Mod: S$GLB,,, | Performed by: INTERNAL MEDICINE

## 2017-05-16 PROCEDURE — 99499 UNLISTED E&M SERVICE: CPT | Mod: S$GLB,,, | Performed by: INTERNAL MEDICINE

## 2017-05-16 PROCEDURE — 3079F DIAST BP 80-89 MM HG: CPT | Mod: S$GLB,,, | Performed by: INTERNAL MEDICINE

## 2017-05-16 PROCEDURE — 99214 OFFICE O/P EST MOD 30 MIN: CPT | Mod: S$GLB,,, | Performed by: INTERNAL MEDICINE

## 2017-05-16 PROCEDURE — 1160F RVW MEDS BY RX/DR IN RCRD: CPT | Mod: S$GLB,,, | Performed by: INTERNAL MEDICINE

## 2017-05-16 PROCEDURE — 99999 PR PBB SHADOW E&M-EST. PATIENT-LVL II: CPT | Mod: PBBFAC,,, | Performed by: INTERNAL MEDICINE

## 2017-05-16 PROCEDURE — 1159F MED LIST DOCD IN RCRD: CPT | Mod: S$GLB,,, | Performed by: INTERNAL MEDICINE

## 2017-05-16 PROCEDURE — 1126F AMNT PAIN NOTED NONE PRSNT: CPT | Mod: S$GLB,,, | Performed by: INTERNAL MEDICINE

## 2017-05-16 RX ORDER — MEMANTINE HYDROCHLORIDE 10 MG/1
TABLET ORAL
Qty: 60 TABLET | Refills: 9 | Status: SHIPPED | OUTPATIENT
Start: 2017-05-16

## 2017-05-16 RX ORDER — FUROSEMIDE 20 MG/1
20 TABLET ORAL DAILY
Refills: 6 | COMMUNITY
Start: 2017-05-03 | End: 2017-05-16 | Stop reason: SDUPTHER

## 2017-05-16 RX ORDER — FUROSEMIDE 20 MG/1
20 TABLET ORAL DAILY
Qty: 30 TABLET | Refills: 6 | Status: SHIPPED | OUTPATIENT
Start: 2017-05-16

## 2017-05-16 RX ORDER — METOPROLOL TARTRATE 50 MG/1
50 TABLET ORAL 2 TIMES DAILY
Qty: 60 TABLET | Refills: 6 | Status: SHIPPED | OUTPATIENT
Start: 2017-05-16 | End: 2017-11-11 | Stop reason: SDUPTHER

## 2017-05-16 NOTE — PROGRESS NOTES
Subjective:   Patient ID:  Radha Morales is a 85 y.o. female who presents for follow-up after hospital dc for sepsis.  Pt with weakness since then.Patient denies CP, angina or anginal equivalent.    HPI    ROS  Family History   Problem Relation Age of Onset    Stroke Mother     Coronary artery disease Mother     Stroke Son      Past Medical History:   Diagnosis Date    Alzheimer disease     Anemia     Carotid artery occlusion     Chronic kidney disease (CKD), stage III (moderate)     Coronary artery disease     HTN (hypertension)     Hyperlipidemia, acquired     Memory loss     Alzheimer's Disease    Obesity     Obesity (BMI 30.0-34.9) 12/9/2015    Osteoarthritis of multiple joints 7/12/2016    Pure hypercholesterolemia     UTI (lower urinary tract infection) 7/8/14     Current Outpatient Prescriptions on File Prior to Visit   Medication Sig Dispense Refill    aspirin (ASPIRIN CHILDRENS) 81 MG Chew Take by mouth. 1 Tablet, Chewable Oral Every day      cholecalciferol, vitamin D3, 2,000 unit Cap Take 1 capsule (2,000 Units total) by mouth once daily. 100 capsule 6    fluticasone (FLONASE) 50 mcg/actuation nasal spray 1 spray by Each Nare route once daily. 1 Bottle 0    memantine (NAMENDA) 10 MG Tab TAKE ONE TABLET BY MOUTH TWICE DAILY 60 tablet 9    quetiapine (SEROQUEL) 50 MG tablet TAKE 1 OR 2 TABLETS BY MOUTH AT BEDTIME 60 tablet 6    [DISCONTINUED] metoprolol tartrate (LOPRESSOR) 50 MG tablet TAKE ONE TABLET BY MOUTH TWICE DAILY 60 tablet 6     No current facility-administered medications on file prior to visit.      Review of patient's allergies indicates:   Allergen Reactions    Penicillins      Other reaction(s): Unknown    Propoxyphene n-acetaminophen      Other reaction(s): Rash       Objective:     Physical Exam    Assessment:     1. Bilateral carotid artery disease    2. Essential hypertension    3. Coronary artery disease due to calcified coronary lesion    4. SVT  (supraventricular tachycardia)    5. Calcification of aorta    6. Left ventricular diastolic dysfunction with preserved systolic function        Plan:     Bilateral carotid artery disease    Essential hypertension  -     metoprolol tartrate (LOPRESSOR) 50 MG tablet; Take 1 tablet (50 mg total) by mouth 2 (two) times daily.  Dispense: 60 tablet; Refill: 6    Coronary artery disease due to calcified coronary lesion    SVT (supraventricular tachycardia)    Calcification of aorta    Left ventricular diastolic dysfunction with preserved systolic function    Other orders  -     furosemide (LASIX) 20 MG tablet; Take 1 tablet (20 mg total) by mouth once daily.  Dispense: 30 tablet; Refill: 6    echo

## 2017-05-16 NOTE — MR AVS SNAPSHOT
[facility]  [address] [facility] [address]  Phone: [phone]  Fax: [phone]                  Radha Morales   2017 9:40 AM   Office Visit    Description:  Female : 1931   Provider:  Mendoza Murillo MD   Department:  [facility]           Diagnoses this Visit        Comments    Bilateral carotid artery disease    -  Primary     Essential hypertension         Coronary artery disease due to calcified coronary lesion         SVT (supraventricular tachycardia)         Calcification of aorta         Left ventricular diastolic dysfunction with preserved systolic function                To Do List           Future Appointments        Provider Department Dept Phone    2017 10:20 AM Lilly Butts DPM [facility] Podiatry [phone]    2017 11:00 AM ROBERT [facility] Internal Medicine [phone]    2017 9:40 AM Ashleigh Herrera MD [facility] Internal Medicine [phone]      Goals (5 Years of Data)     None      Follow-Up and Disposition     Return in about 3 months (around 2017).    Follow-up and Disposition History       These Medications        Disp Refills Start End    metoprolol tartrate (LOPRESSOR) 50 MG tablet 60 tablet 6 2017     Take 1 tablet (50 mg total) by mouth 2 (two) times daily. - Oral    Pharmacy: [facility] - [address] Ph #: [phone]       Notes to Pharmacy: This prescription was filled today(2017). Any refills authorized will be placed on file.    furosemide (LASIX) 20 MG tablet 30 tablet 6 2017     Take 1 tablet (20 mg total) by mouth once daily. - Oral    Pharmacy: [facility] - [address] Ph #: [phone]         [facility] On [facility]     [facility] Nurse Care Line -  Assistance  Unless otherwise directed by your provider, please contact [facility], our nurse care line that is  available for 24/7 assistance.     Registered nurses in the [facility] provide: appointment scheduling, clinical advisement, health education, and other advisory services.  Call: [phone] (toll free)               Medications           Message regarding Medications     Verify the changes and/or additions to your medication regime listed below are the same as discussed with your clinician today.  If any of these changes or additions are incorrect, please notify your healthcare provider.        START taking these NEW medications        Refills    furosemide (LASIX) 20 MG tablet 6    Sig: Take 1 tablet (20 mg total) by mouth once daily.    Class: Normal    Route: Oral      CHANGE how you are taking these medications     Start Taking Instead of    metoprolol tartrate (LOPRESSOR) 50 MG tablet metoprolol tartrate (LOPRESSOR) 50 MG tablet    Dosage:  Take 1 tablet (50 mg total) by mouth 2 (two) times daily. Dosage:  TAKE ONE TABLET BY MOUTH TWICE DAILY    Reason for Change:  Reorder            Verify that the below list of medications is an accurate representation of the medications you are currently taking.  If none reported, the list may be blank. If incorrect, please contact your healthcare provider. Carry this list with you in case of emergency.           Current Medications     aspirin (ASPIRIN CHILDRENS) 81 MG Chew Take by mouth. 1 Tablet, Chewable Oral Every day    cholecalciferol, vitamin D3, 2,000 unit Cap Take 1 capsule (2,000 Units total) by mouth once daily.    fluticasone (FLONASE) 50 mcg/actuation nasal spray 1 spray by Each Nare route once daily.    furosemide (LASIX) 20 MG tablet Take 1 tablet (20 mg total) by mouth once daily.    memantine (NAMENDA) 10 MG Tab TAKE ONE TABLET BY MOUTH TWICE DAILY    metoprolol tartrate (LOPRESSOR) 50 MG tablet Take 1 tablet (50 mg total) by mouth 2 (two) times daily.    quetiapine (SEROQUEL) 50 MG tablet TAKE 1 OR 2 TABLETS BY MOUTH AT BEDTIME          "  Clinical Reference Information           Your Vitals Were     BP Pulse Height Last Period SpO2       140/84 (BP Location: Left arm, Patient Position: Sitting, BP Method: Manual) 70 5' 2" (1.575 m) (LMP Unknown) 97%       Blood Pressure          Most Recent Value    BP  (!)  140/84      Allergies as of 5/16/2017     Penicillins    Propoxyphene N-acetaminophen      Immunizations Administered on Date of Encounter - 5/16/2017     None      Orders Placed During Today's Visit      Normal Orders This Visit    2D echo with color flow doppler       [facility] Sign-Up     Activating your [facility] account is as easy as 1-2-3!     1) Visit [email or web address], select Sign Up Now, enter this activation code and your date of birth, then select Next.  [email or web address]  Expires: 6/30/2017 10:50 AM      2) Create a username and password to use when you visit [facility] in the future and select a security question in case you lose your password and select Next.    3) Enter your e-mail address and click Sign Up!    Additional Information  If you have questions, please e-mail [email or web address] or call [phone] to talk to our [facility] staff. Remember, [facility] is NOT to be used for urgent needs. For medical emergencies, dial 911.         Language Assistance Services     ATTENTION: Language assistance services are available, free of charge. Please call [phone].      ATENCIÓN: Si habla español, tiene a castro disposición servicios gratuitos de asistencia lingüística. Llame al [phone].     [facility] Ý: N?u b?n nói Ti?ng Vi?t, có các d?ch v? h? tr? ngôn ng? mi?n phí dành cho b?n. G?i s? [phone].         [facility] complies with applicable Federal civil rights laws and does not discriminate on the basis of race, color, national origin, age, disability, or sex.        "

## 2017-05-23 ENCOUNTER — TELEPHONE (OUTPATIENT)
Dept: INTERNAL MEDICINE | Facility: CLINIC | Age: 82
End: 2017-05-23

## 2017-05-23 NOTE — TELEPHONE ENCOUNTER
Patient was seen with cardiology they want to have a order for more physical therapy and a home health nurse marianne     Please see if this can be called to extend PT with HH.  EUSEBIA

## 2017-05-23 NOTE — TELEPHONE ENCOUNTER
----- Message from Olesya Lin sent at 5/23/2017  2:00 PM CDT -----  Contact: Andra/daughter  Andra is requesting to speak to the nurse regarding an order that pt needs for therapy and home health. Pls call Andra back at [phone].

## 2017-06-07 ENCOUNTER — TELEPHONE (OUTPATIENT)
Dept: INTERNAL MEDICINE | Facility: CLINIC | Age: 82
End: 2017-06-07

## 2017-06-07 NOTE — TELEPHONE ENCOUNTER
----- Message from Fartun De Leon sent at 6/7/2017 10:02 AM CDT -----  Pt at [phone]//states Dr Murillo/Cardio /suggested pt needs to have physical therapy//is calling to get a referral put in for the therapy or an appt with Dr Herrera//please call asa//thanks/[facility]

## 2017-07-12 ENCOUNTER — OFFICE VISIT (OUTPATIENT)
Dept: INTERNAL MEDICINE | Facility: CLINIC | Age: 82
End: 2017-07-12
Payer: MEDICARE

## 2017-07-12 ENCOUNTER — TELEPHONE (OUTPATIENT)
Dept: INTERNAL MEDICINE | Facility: CLINIC | Age: 82
End: 2017-07-12

## 2017-07-12 ENCOUNTER — LAB VISIT (OUTPATIENT)
Dept: LAB | Facility: HOSPITAL | Age: 82
End: 2017-07-12
Attending: INTERNAL MEDICINE
Payer: MEDICARE

## 2017-07-12 VITALS
HEART RATE: 64 BPM | DIASTOLIC BLOOD PRESSURE: 72 MMHG | SYSTOLIC BLOOD PRESSURE: 140 MMHG | HEIGHT: 62 IN | OXYGEN SATURATION: 98 % | SYSTOLIC BLOOD PRESSURE: 116 MMHG | TEMPERATURE: 96 F | HEIGHT: 62 IN | BODY MASS INDEX: 29.08 KG/M2 | HEART RATE: 63 BPM | TEMPERATURE: 96 F | DIASTOLIC BLOOD PRESSURE: 80 MMHG | WEIGHT: 158.06 LBS

## 2017-07-12 DIAGNOSIS — I47.10 SVT (SUPRAVENTRICULAR TACHYCARDIA): ICD-10-CM

## 2017-07-12 DIAGNOSIS — E78.00 PURE HYPERCHOLESTEROLEMIA: ICD-10-CM

## 2017-07-12 DIAGNOSIS — I25.10 CORONARY ARTERY DISEASE WITHOUT ANGINA PECTORIS, UNSPECIFIED VESSEL OR LESION TYPE, UNSPECIFIED WHETHER NATIVE OR TRANSPLANTED HEART: Chronic | ICD-10-CM

## 2017-07-12 DIAGNOSIS — N18.30 CHRONIC KIDNEY DISEASE (CKD), STAGE III (MODERATE): Chronic | ICD-10-CM

## 2017-07-12 DIAGNOSIS — I10 ESSENTIAL HYPERTENSION: Chronic | ICD-10-CM

## 2017-07-12 DIAGNOSIS — E66.9 OBESITY (BMI 30.0-34.9): ICD-10-CM

## 2017-07-12 DIAGNOSIS — R53.81 PHYSICAL DEBILITY: ICD-10-CM

## 2017-07-12 DIAGNOSIS — D63.1 ANEMIA IN STAGE 3 CHRONIC KIDNEY DISEASE: ICD-10-CM

## 2017-07-12 DIAGNOSIS — F02.80 LATE ONSET ALZHEIMER'S DISEASE WITHOUT BEHAVIORAL DISTURBANCE: Primary | Chronic | ICD-10-CM

## 2017-07-12 DIAGNOSIS — Z00.00 ENCOUNTER FOR PREVENTIVE HEALTH EXAMINATION: ICD-10-CM

## 2017-07-12 DIAGNOSIS — F02.80 LATE ONSET ALZHEIMER'S DISEASE WITHOUT BEHAVIORAL DISTURBANCE: ICD-10-CM

## 2017-07-12 DIAGNOSIS — G30.1 LATE ONSET ALZHEIMER'S DISEASE WITHOUT BEHAVIORAL DISTURBANCE: ICD-10-CM

## 2017-07-12 DIAGNOSIS — I70.0 CALCIFICATION OF AORTA: ICD-10-CM

## 2017-07-12 DIAGNOSIS — E78.00 PURE HYPERCHOLESTEROLEMIA: Chronic | ICD-10-CM

## 2017-07-12 DIAGNOSIS — N18.30 ANEMIA IN STAGE 3 CHRONIC KIDNEY DISEASE: ICD-10-CM

## 2017-07-12 DIAGNOSIS — G30.1 LATE ONSET ALZHEIMER'S DISEASE WITHOUT BEHAVIORAL DISTURBANCE: Primary | Chronic | ICD-10-CM

## 2017-07-12 DIAGNOSIS — Z91.81 RISK FOR FALLS: ICD-10-CM

## 2017-07-12 DIAGNOSIS — I51.89 LEFT VENTRICULAR DIASTOLIC DYSFUNCTION WITH PRESERVED SYSTOLIC FUNCTION: ICD-10-CM

## 2017-07-12 DIAGNOSIS — I25.10 CORONARY ARTERY DISEASE WITHOUT ANGINA PECTORIS, UNSPECIFIED VESSEL OR LESION TYPE, UNSPECIFIED WHETHER NATIVE OR TRANSPLANTED HEART: Primary | ICD-10-CM

## 2017-07-12 DIAGNOSIS — I77.9 BILATERAL CAROTID ARTERY DISEASE: Chronic | ICD-10-CM

## 2017-07-12 PROBLEM — D64.9 ANEMIA: Status: ACTIVE | Noted: 2017-07-12

## 2017-07-12 PROBLEM — E66.3 OVERWEIGHT (BMI 25.0-29.9): Status: ACTIVE | Noted: 2017-07-12

## 2017-07-12 LAB
ALBUMIN SERPL BCP-MCNC: 3.3 G/DL
ALP SERPL-CCNC: 98 U/L
ALT SERPL W/O P-5'-P-CCNC: 26 U/L
ANION GAP SERPL CALC-SCNC: 11 MMOL/L
AST SERPL-CCNC: 31 U/L
BASOPHILS # BLD AUTO: 0.04 K/UL
BASOPHILS NFR BLD: 0.6 %
BILIRUB SERPL-MCNC: 0.6 MG/DL
BUN SERPL-MCNC: 11 MG/DL
CALCIUM SERPL-MCNC: 9.9 MG/DL
CHLORIDE SERPL-SCNC: 106 MMOL/L
CO2 SERPL-SCNC: 27 MMOL/L
CREAT SERPL-MCNC: 0.8 MG/DL
DIFFERENTIAL METHOD: ABNORMAL
EOSINOPHIL # BLD AUTO: 0.3 K/UL
EOSINOPHIL NFR BLD: 3.9 %
ERYTHROCYTE [DISTWIDTH] IN BLOOD BY AUTOMATED COUNT: 14.5 %
EST. GFR  (AFRICAN AMERICAN): >60 ML/MIN/1.73 M^2
EST. GFR  (NON AFRICAN AMERICAN): >60 ML/MIN/1.73 M^2
GLUCOSE SERPL-MCNC: 98 MG/DL
HCT VFR BLD AUTO: 39.8 %
HGB BLD-MCNC: 13.3 G/DL
LYMPHOCYTES # BLD AUTO: 2 K/UL
LYMPHOCYTES NFR BLD: 31.3 %
MCH RBC QN AUTO: 26.5 PG
MCHC RBC AUTO-ENTMCNC: 33.4 %
MCV RBC AUTO: 79 FL
MONOCYTES # BLD AUTO: 0.7 K/UL
MONOCYTES NFR BLD: 11.5 %
NEUTROPHILS # BLD AUTO: 3.4 K/UL
NEUTROPHILS NFR BLD: 52.5 %
PLATELET # BLD AUTO: 221 K/UL
PMV BLD AUTO: 11.9 FL
POTASSIUM SERPL-SCNC: 3.4 MMOL/L
PROT SERPL-MCNC: 8.3 G/DL
RBC # BLD AUTO: 5.02 M/UL
SODIUM SERPL-SCNC: 144 MMOL/L
TSH SERPL DL<=0.005 MIU/L-ACNC: 2.07 UIU/ML
WBC # BLD AUTO: 6.43 K/UL

## 2017-07-12 PROCEDURE — 85025 COMPLETE CBC W/AUTO DIFF WBC: CPT

## 2017-07-12 PROCEDURE — 99999 PR PBB SHADOW E&M-EST. PATIENT-LVL III: CPT | Mod: PBBFAC,,, | Performed by: PHYSICIAN ASSISTANT

## 2017-07-12 PROCEDURE — 99499 UNLISTED E&M SERVICE: CPT | Mod: S$GLB,,, | Performed by: PHYSICIAN ASSISTANT

## 2017-07-12 PROCEDURE — 99397 PER PM REEVAL EST PAT 65+ YR: CPT | Mod: S$GLB,,, | Performed by: INTERNAL MEDICINE

## 2017-07-12 PROCEDURE — 84443 ASSAY THYROID STIM HORMONE: CPT

## 2017-07-12 PROCEDURE — 36415 COLL VENOUS BLD VENIPUNCTURE: CPT | Mod: PO

## 2017-07-12 PROCEDURE — 1159F MED LIST DOCD IN RCRD: CPT | Mod: S$GLB,,, | Performed by: PHYSICIAN ASSISTANT

## 2017-07-12 PROCEDURE — 99213 OFFICE O/P EST LOW 20 MIN: CPT | Mod: S$GLB,,, | Performed by: PHYSICIAN ASSISTANT

## 2017-07-12 PROCEDURE — 80053 COMPREHEN METABOLIC PANEL: CPT

## 2017-07-12 PROCEDURE — 99999 PR PBB SHADOW E&M-EST. PATIENT-LVL IV: CPT | Mod: PBBFAC,,, | Performed by: INTERNAL MEDICINE

## 2017-07-12 PROCEDURE — 1126F AMNT PAIN NOTED NONE PRSNT: CPT | Mod: S$GLB,,, | Performed by: PHYSICIAN ASSISTANT

## 2017-07-12 NOTE — PROGRESS NOTES
"Subjective:       Patient ID: Radha Morales is a 85 y.o. female.    Chief Complaint: Follow-up    Here for f/u medical problems and preventive exam.  Appetite ok, eating well.  No f/c/sw/cough.  No cp/sob/palp.  BMs normal.  Urine normal.  Wears diaper.  Didn't get bedside commode.  Needing more assistance with transferring, usually 2-3 people together.  Needs new mattress.      Updated/ annual due 6/17:  HM: 11/16 fluvax, 6/15 [id number], 3/16 booster [id number] with adverse rxn, 10/13 TDaP, 8/11 zostervax.          Review of Systems   Constitutional: Negative for appetite change, chills, diaphoresis and fever.   HENT: Negative for congestion, ear pain, rhinorrhea, sinus pressure and sore throat.    Respiratory: Negative for cough, chest tightness and shortness of breath.    Cardiovascular: Negative for chest pain and palpitations.   Gastrointestinal: Negative for blood in stool, constipation, diarrhea, nausea and vomiting.   Genitourinary: Negative for dysuria, frequency, hematuria, menstrual problem, urgency and vaginal discharge.   Musculoskeletal: Negative for arthralgias.   Skin: Negative for rash.   Neurological: Negative for dizziness and headaches.   Psychiatric/Behavioral: Negative for sleep disturbance. The patient is not nervous/anxious.        Objective:   BP (!) 140/80   Pulse 64   Temp 96 °F (35.6 °C) (Tympanic)   Ht 5' 2" (1.575 m)   LMP  (LMP Unknown)   SpO2 98%     Physical Exam   Constitutional: She appears well-developed and well-nourished.   HENT:   Right Ear: External ear normal. Tympanic membrane is not injected.   Left Ear: External ear normal. Tympanic membrane is not injected.   Mouth/Throat: Oropharynx is clear and moist.   Eyes: Conjunctivae are normal.   Neck: Normal range of motion. Neck supple. No thyromegaly present.   Cardiovascular: Normal rate, regular rhythm and intact distal pulses.  Exam reveals no gallop and no friction rub.    No murmur heard.  Pulmonary/Chest: Effort " normal and breath sounds normal. She has no wheezes. She has no rales.   Abdominal: Soft. Bowel sounds are normal. She exhibits no mass. There is no tenderness.   Musculoskeletal: She exhibits no edema.   Lymphadenopathy:     She has no cervical adenopathy.   Neurological: She is alert.   Skin: Skin is warm. No rash noted.   Psychiatric: She has a normal mood and affect.       Assessment:       1. Coronary artery disease without angina pectoris, unspecified vessel or lesion type, unspecified whether native or transplanted heart    2. Essential hypertension    3. Late onset Alzheimer's disease without behavioral disturbance    4. Pure hypercholesterolemia    5. Encounter for preventive health examination    6. Physical debility    7. Risk for falls        Plan:       Radha was seen today for follow-up.    Diagnoses and all orders for this visit:    Coronary artery disease without angina pectoris, unspecified vessel or lesion type, unspecified whether native or transplanted heart    Essential hypertension- stable at home?   nurse recheck vitals and help with advise on assistance in the home.    Late onset Alzheimer's disease without behavioral disturbance, Physical debility, Risk for falls- order bedside commode, low pressure air mattress.    Pure hypercholesterolemia- check lab.    Encounter for preventive health examination  -     Comprehensive metabolic panel; Future  -     CBC auto differential; Future  -     TSH; Future    RTC 3mo.

## 2017-07-12 NOTE — TELEPHONE ENCOUNTER
----- Message from Gena Christie sent at 7/12/2017 12:46 PM CDT -----  Contact: MireyaMildred Phoebeshyam Moreno [facility] -[phone]  Pt will be a new admitt, pt would like to know when hospital bed will be arriving.  Would like an order for hospital bed and to consult with the nurse.  Please call back at [phone]. Thanks-[facility]

## 2017-07-12 NOTE — PROGRESS NOTES
"Radha Morales presented for a  Medicare AWV and comprehensive Health Risk Assessment today. The following components were reviewed and updated:    · Medical history  · Family History  · Social history  · Allergies and Current Medications  · Health Risk Assessment  · Health Maintenance  · Care Team     She was called and biopsy her clinic to come in for this health risk assessment today.  She arrived in a wheelchair and really does not respond or answer to any questions.  She currently lives at home in her own home and a couple of her daughters live there with her taking care of her.    See Completed Assessments for Annual Wellness Visit within the encounter summary.       The following assessments were completed:  · Living Situation  · CAGE  · Depression Screening  · Timed Get Up and Go  · Whisper Test  · Cognitive Function Screening  · Nutrition Screening  · ADL Screening  · PAQ Screening    Vitals:    07/12/17 1037   BP: 116/72   BP Location: Right arm   Patient Position: Sitting   Pulse: 63   Temp: 96.3 °F (35.7 °C)   TempSrc: Tympanic   Weight: 71.7 kg (158 lb 1.1 oz)   Height: 5' 2" (1.575 m)     Body mass index is 28.91 kg/m².  Physical Exam   Constitutional: She appears well-developed and well-nourished.   She basically sits slumped forward in her wheelchair here in the office and really does not contribute any vocalization.   HENT:   Head: Normocephalic and atraumatic.   Right Ear: External ear normal.   Left Ear: External ear normal.   Nose: Nose normal.   Mouth/Throat: Oropharynx is clear and moist. No oropharyngeal exudate.   She was not wearing any glasses and she was not using any hearing aids.   Eyes: Conjunctivae and EOM are normal. Pupils are equal, round, and reactive to light. No scleral icterus.   Neck: Normal range of motion. Neck supple. No JVD present. No tracheal deviation present. No thyromegaly present.   Cardiovascular: Normal rate, regular rhythm, normal heart sounds and intact distal " pulses.  Exam reveals no gallop and no friction rub.    No murmur heard.  Pulmonary/Chest: Effort normal and breath sounds normal. No respiratory distress. She has no wheezes. She has no rales.   Abdominal: Soft. Bowel sounds are normal. She exhibits no distension and no mass. There is no tenderness. There is no rebound and no guarding.   Genitourinary:   Genitourinary Comments: This portion of the exam was not accomplished today.   Musculoskeletal: Normal range of motion. She exhibits no edema or tenderness.   Lymphadenopathy:     She has no cervical adenopathy.   Neurological: She is alert. She has normal reflexes. She displays normal reflexes.   Skin: Skin is warm and dry. No rash noted. She is not diaphoretic. No erythema.   Psychiatric: Her behavior is normal.   Nursing note and vitals reviewed.        Diagnoses and health risks identified today and associated recommendations/orders:    1. Late onset Alzheimer's disease without behavioral disturbance  She is adequately followed by her neurologist Dr. Aurora PARKER    2. Bilateral carotid artery disease  She is adequately followed by her PCP Dr. Ashleigh Herrera M.D.    3. Coronary artery disease without angina pectoris, unspecified vessel or lesion type, unspecified whether native or transplanted heart  She is adequately followed by her cardiologist Dr. Chidi PARKER    4. Essential hypertension  Check answer to #2.    5. SVT (supraventricular tachycardia)  Check answer to #3.    6. Calcification of aorta  Check answer to #3.    7. Left ventricular diastolic dysfunction with preserved systolic function  Check answer to #3.    8. Chronic kidney disease (CKD), stage III (moderate)  She is adequately followed by her nephrologist Dr. LUCIO Magdaleno M.D.      9. Pure hypercholesterolemia  Check answer to #2.    10. Obesity (BMI 30.0-34.9)  Check answer to #2.    11. Anemia in stage 3 chronic kidney disease  Check answer to #8.      Provided Radha with a 5-10 year written  screening schedule and personal prevention plan. Recommendations were developed using the USPSTF age appropriate recommendations. Education, counseling, and referrals were provided as needed. After Visit Summary printed and given to patient which includes a list of additional screenings\tests needed.    She is adequately followed by her PCP and is up-to-date on all of her health maintenance needs.  There is no need to order any ancillary studies today.    Return in about 3 months (around 10/12/2017).    Dale Giles PA-C

## 2017-07-13 ENCOUNTER — TELEPHONE (OUTPATIENT)
Dept: INTERNAL MEDICINE | Facility: CLINIC | Age: 82
End: 2017-07-13

## 2017-07-14 NOTE — TELEPHONE ENCOUNTER
----- Message from Harshal Morales sent at 7/14/2017 11:15 AM CDT -----  Contact: Pt  Pt is returning a missed call.  Please advise [phone] (home) [phone] (work)

## 2017-07-26 ENCOUNTER — TELEPHONE (OUTPATIENT)
Dept: INTERNAL MEDICINE | Facility: CLINIC | Age: 82
End: 2017-07-26

## 2017-07-26 NOTE — TELEPHONE ENCOUNTER
----- Message from Cecilia Carrillo sent at 7/26/2017  9:48 AM CDT -----  Contact: pt  She's calling stating that she had a RX for a mattress and the company that was referred by doctor does not carry the brand, wants to know if there is another company that she can recommend, please advise [phone]

## 2017-07-26 NOTE — TELEPHONE ENCOUNTER
----- Message from Chitra Cosat sent at 7/26/2017 10:43 AM CDT -----  Contact: PT   Call pt regarding mattress.   ..[phone] (home) [phone] (work)

## 2017-07-26 NOTE — TELEPHONE ENCOUNTER
Spoke with caregiver.  States HH had some DME items delivered but did not get a mattress.  She wanted to know where else to go to get one.  Advised her to ask HH who they usually use./rpr

## 2017-08-14 LAB
DIASTOLIC DYSFUNCTION: YES
ESTIMATED PA SYSTOLIC PRESSURE: 38.19
MITRAL VALVE REGURGITATION: ABNORMAL
RETIRED EF AND QEF - SEE NOTES: 55 (ref 55–65)
TRICUSPID VALVE REGURGITATION: ABNORMAL

## 2017-08-15 DIAGNOSIS — M89.9 BONE DISORDER: ICD-10-CM

## 2017-08-15 RX ORDER — NICOTINE 11MG/24HR
PATCH, TRANSDERMAL 24 HOURS TRANSDERMAL
Qty: 100 CAPSULE | Refills: 3 | Status: SHIPPED | OUTPATIENT
Start: 2017-08-15

## 2017-08-23 ENCOUNTER — OFFICE VISIT (OUTPATIENT)
Dept: CARDIOLOGY | Facility: CLINIC | Age: 82
End: 2017-08-23
Payer: MEDICARE

## 2017-08-23 VITALS
HEART RATE: 74 BPM | HEIGHT: 62 IN | BODY MASS INDEX: 32.94 KG/M2 | WEIGHT: 179 LBS | SYSTOLIC BLOOD PRESSURE: 138 MMHG | DIASTOLIC BLOOD PRESSURE: 70 MMHG

## 2017-08-23 DIAGNOSIS — I51.89 LEFT VENTRICULAR DIASTOLIC DYSFUNCTION WITH PRESERVED SYSTOLIC FUNCTION: ICD-10-CM

## 2017-08-23 DIAGNOSIS — I47.10 SVT (SUPRAVENTRICULAR TACHYCARDIA): Primary | ICD-10-CM

## 2017-08-23 DIAGNOSIS — N18.30 CHRONIC KIDNEY DISEASE (CKD), STAGE III (MODERATE): Chronic | ICD-10-CM

## 2017-08-23 DIAGNOSIS — I10 ESSENTIAL HYPERTENSION: Chronic | ICD-10-CM

## 2017-08-23 DIAGNOSIS — I70.0 CALCIFICATION OF AORTA: ICD-10-CM

## 2017-08-23 DIAGNOSIS — I25.10 CORONARY ARTERY DISEASE WITHOUT ANGINA PECTORIS, UNSPECIFIED VESSEL OR LESION TYPE, UNSPECIFIED WHETHER NATIVE OR TRANSPLANTED HEART: Chronic | ICD-10-CM

## 2017-08-23 DIAGNOSIS — I77.9 BILATERAL CAROTID ARTERY DISEASE: Chronic | ICD-10-CM

## 2017-08-23 PROCEDURE — 1159F MED LIST DOCD IN RCRD: CPT | Mod: S$GLB,,, | Performed by: INTERNAL MEDICINE

## 2017-08-23 PROCEDURE — 3075F SYST BP GE 130 - 139MM HG: CPT | Mod: S$GLB,,, | Performed by: INTERNAL MEDICINE

## 2017-08-23 PROCEDURE — 3008F BODY MASS INDEX DOCD: CPT | Mod: S$GLB,,, | Performed by: INTERNAL MEDICINE

## 2017-08-23 PROCEDURE — 1126F AMNT PAIN NOTED NONE PRSNT: CPT | Mod: S$GLB,,, | Performed by: INTERNAL MEDICINE

## 2017-08-23 PROCEDURE — 99999 PR PBB SHADOW E&M-EST. PATIENT-LVL III: CPT | Mod: PBBFAC,,, | Performed by: INTERNAL MEDICINE

## 2017-08-23 PROCEDURE — 99214 OFFICE O/P EST MOD 30 MIN: CPT | Mod: S$GLB,,, | Performed by: INTERNAL MEDICINE

## 2017-08-23 PROCEDURE — 99499 UNLISTED E&M SERVICE: CPT | Mod: S$GLB,,, | Performed by: INTERNAL MEDICINE

## 2017-08-23 PROCEDURE — 3078F DIAST BP <80 MM HG: CPT | Mod: S$GLB,,, | Performed by: INTERNAL MEDICINE

## 2017-08-23 NOTE — PROGRESS NOTES
Subjective:   Patient ID:  Radha Morales is a 85 y.o. female who presents for follow-up of Hypertension and Follow-up (echo testing)  Echo- nml lv function.Patient denies CP, angina or anginal equivalent.BP controlled at home.Pt not very active    Hypertension   This is a chronic problem. The current episode started more than 1 year ago. The problem has been gradually improving since onset. The problem is controlled. Pertinent negatives include no chest pain, palpitations or shortness of breath. Past treatments include beta blockers. The current treatment provides moderate improvement. Compliance problems include medication side effects.    Coronary Artery Disease   Presents for follow-up visit. Pertinent negatives include no chest pain, chest pressure, chest tightness, dizziness, leg swelling, muscle weakness, palpitations, shortness of breath or weight gain. The symptoms have been stable. Compliance with diet is variable. Compliance with exercise is variable. Compliance with medications is good.       Review of Systems   Constitution: Negative. Negative for weight gain.   HENT: Negative.    Eyes: Negative.    Cardiovascular: Negative.  Negative for chest pain, leg swelling and palpitations.   Respiratory: Negative.  Negative for chest tightness and shortness of breath.    Endocrine: Negative.    Hematologic/Lymphatic: Negative.    Skin: Negative.    Musculoskeletal: Negative for muscle weakness.   Gastrointestinal: Negative.    Genitourinary: Negative.    Neurological: Negative.  Negative for dizziness.   Psychiatric/Behavioral: Negative.    Allergic/Immunologic: Negative.      Family History   Problem Relation Age of Onset    Stroke Mother     Coronary artery disease Mother     Stroke Son      Past Medical History:   Diagnosis Date    Anemia     Chronic kidney disease (CKD), stage III (moderate)     Obesity (BMI 30.0-34.9) 12/9/2015    Osteoarthritis of multiple joints 7/12/2016    Pure  hypercholesterolemia      Current Outpatient Prescriptions on File Prior to Visit   Medication Sig Dispense Refill    aspirin (ASPIRIN CHILDRENS) 81 MG Chew Take by mouth. 1 Tablet, Chewable Oral Every day      fluticasone (FLONASE) 50 mcg/actuation nasal spray 1 spray by Each Nare route once daily. 1 Bottle 0    furosemide (LASIX) 20 MG tablet Take 1 tablet (20 mg total) by mouth once daily. 30 tablet 6    memantine (NAMENDA) 10 MG Tab TAKE ONE TABLET BY MOUTH TWICE DAILY 60 tablet 9    metoprolol tartrate (LOPRESSOR) 50 MG tablet Take 1 tablet (50 mg total) by mouth 2 (two) times daily. 60 tablet 6    quetiapine (SEROQUEL) 50 MG tablet TAKE 1 OR 2 TABLETS BY MOUTH AT BEDTIME 60 tablet 6    VITAMIN D3 2,000 unit Cap TAKE 1 CAPSULE BY MOUTH DAILY 100 capsule 3     No current facility-administered medications on file prior to visit.      Review of patient's allergies indicates:   Allergen Reactions    Penicillins      Other reaction(s): Unknown    Propoxyphene n-acetaminophen      Other reaction(s): Rash       Objective:     Physical Exam   Constitutional: She is oriented to person, place, and time. She appears well-developed and well-nourished.   HENT:   Head: Normocephalic and atraumatic.   Eyes: Conjunctivae and EOM are normal. Pupils are equal, round, and reactive to light.   Neck: Normal range of motion. Neck supple.   Cardiovascular: Normal rate, regular rhythm, normal heart sounds and intact distal pulses.    Pulmonary/Chest: Effort normal and breath sounds normal.   Abdominal: Soft. Bowel sounds are normal.   Musculoskeletal: Normal range of motion.   Neurological: She is alert and oriented to person, place, and time.   Skin: Skin is warm and dry.   Psychiatric: She has a normal mood and affect.   Nursing note and vitals reviewed.      Assessment:     1. SVT (supraventricular tachycardia)    2. Calcification of aorta    3. Left ventricular diastolic dysfunction with preserved systolic function    4.  Coronary artery disease without angina pectoris, unspecified vessel or lesion type, unspecified whether native or transplanted heart    5. Bilateral carotid artery disease    6. Essential hypertension    7. Chronic kidney disease (CKD), stage III (moderate)        Plan:     SVT (supraventricular tachycardia)    Calcification of aorta    Left ventricular diastolic dysfunction with preserved systolic function    Coronary artery disease without angina pectoris, unspecified vessel or lesion type, unspecified whether native or transplanted heart    Bilateral carotid artery disease    Essential hypertension    Chronic kidney disease (CKD), stage III (moderate)      Continue asa-  CAD  Continue metoprolol, -htn

## 2017-10-18 RX ORDER — QUETIAPINE FUMARATE 50 MG/1
TABLET, FILM COATED ORAL
Qty: 60 TABLET | Refills: 6 | Status: SHIPPED | OUTPATIENT
Start: 2017-10-18

## 2017-10-18 RX ORDER — QUETIAPINE FUMARATE 50 MG/1
TABLET, FILM COATED ORAL
Qty: 60 TABLET | Refills: 6 | Status: SHIPPED | OUTPATIENT
Start: 2017-10-18 | End: 2017-11-08 | Stop reason: SDUPTHER

## 2017-10-18 NOTE — TELEPHONE ENCOUNTER
----- Message from Chitra Igor sent at 10/18/2017 10:32 AM CDT -----  Contact: pt   1. What is the name of the medication you are requesting? Quetiapine  2. What is the dose? 50 mg   3. How do you take the medication? Orally, topically, etc? orally  4. How often do you take this medication? Twice a day   5. Do you need a 30 day or 90 day supply? 30  6. How many refills are you requesting? 1  7. What is your preferred pharmacy and location of the pharmacy?   [facility] - [facility] [address]  [address] [address]  Phone: [phone] Fax: [phone]  8. Who can we contact with further questions? the patient

## 2017-11-08 ENCOUNTER — OFFICE VISIT (OUTPATIENT)
Dept: INTERNAL MEDICINE | Facility: CLINIC | Age: 82
End: 2017-11-08
Payer: MEDICARE

## 2017-11-08 DIAGNOSIS — F02.80 LATE ONSET ALZHEIMER'S DISEASE WITHOUT BEHAVIORAL DISTURBANCE: Chronic | ICD-10-CM

## 2017-11-08 DIAGNOSIS — I10 ESSENTIAL HYPERTENSION: Primary | Chronic | ICD-10-CM

## 2017-11-08 DIAGNOSIS — G30.1 LATE ONSET ALZHEIMER'S DISEASE WITHOUT BEHAVIORAL DISTURBANCE: Chronic | ICD-10-CM

## 2017-11-08 DIAGNOSIS — N18.30 CHRONIC KIDNEY DISEASE (CKD), STAGE III (MODERATE): Chronic | ICD-10-CM

## 2017-11-08 PROCEDURE — 99213 OFFICE O/P EST LOW 20 MIN: CPT | Mod: S$GLB,,, | Performed by: INTERNAL MEDICINE

## 2017-11-08 PROCEDURE — 90662 IIV NO PRSV INCREASED AG IM: CPT | Mod: S$GLB,,, | Performed by: INTERNAL MEDICINE

## 2017-11-08 PROCEDURE — 99499 UNLISTED E&M SERVICE: CPT | Mod: S$GLB,,, | Performed by: INTERNAL MEDICINE

## 2017-11-08 PROCEDURE — 99999 PR PBB SHADOW E&M-EST. PATIENT-LVL I: CPT | Mod: PBBFAC,,, | Performed by: INTERNAL MEDICINE

## 2017-11-08 PROCEDURE — G0008 ADMIN INFLUENZA VIRUS VAC: HCPCS | Mod: S$GLB,,, | Performed by: INTERNAL MEDICINE

## 2017-11-08 NOTE — PROGRESS NOTES
Subjective:       Patient ID: Radha oMrales is a 85 y.o. female.    Chief Complaint: Follow-up    Here for follow up of medical problems.  Set up at home, bedside commode.  Eating well.  No c/o.  Wears diaper, BMs normal.    Updated/ annual due 7/18:  HM: 11/17 today fluvax, 6/15 [id number], 3/16 booster [id number] with adverse rxn, 10/13 TDaP, 8/11 zostervax.          Review of Systems   Constitutional: Negative for chills, diaphoresis and fever.   Respiratory: Negative for cough and shortness of breath.    Cardiovascular: Negative for chest pain, palpitations and leg swelling.   Gastrointestinal: Negative for blood in stool, constipation, diarrhea, nausea and vomiting.   Genitourinary: Negative for dysuria, frequency and hematuria.   Psychiatric/Behavioral: The patient is not nervous/anxious.        Objective:   LMP  (LMP Unknown)     Physical Exam   Constitutional: She appears well-developed.   HENT:   Mouth/Throat: Oropharynx is clear and moist.   Neck: Neck supple. Carotid bruit is not present. No thyroid mass present.   Cardiovascular: Normal rate, regular rhythm and intact distal pulses.  Exam reveals no gallop and no friction rub.    No murmur heard.  Pulmonary/Chest: Effort normal and breath sounds normal. She has no wheezes. She has no rales.   Abdominal: Soft. Bowel sounds are normal. She exhibits no mass. There is no hepatosplenomegaly. There is no tenderness.   Musculoskeletal: She exhibits no edema.   Lymphadenopathy:     She has no cervical adenopathy.   Neurological: She is alert.   Psychiatric: She has a normal mood and affect.       Assessment:       1. Essential hypertension    2. Late onset Alzheimer's disease without behavioral disturbance    3. Chronic kidney disease (CKD), stage III (moderate)        Plan:       Radha was seen today for follow-up.    Diagnoses and all orders for this visit:    Essential hypertension- adeq control at home.    Late onset Alzheimer's disease without behavioral  disturbance- cont rx.    Chronic kidney disease (CKD), stage III (moderate)    RTC 6 mo.

## 2017-11-11 DIAGNOSIS — I10 ESSENTIAL HYPERTENSION: ICD-10-CM

## 2017-11-12 RX ORDER — METOPROLOL TARTRATE 50 MG/1
TABLET ORAL
Qty: 60 TABLET | Refills: 11 | Status: SHIPPED | OUTPATIENT
Start: 2017-11-12

## 2018-01-10 ENCOUNTER — PATIENT OUTREACH (OUTPATIENT)
Dept: ADMINISTRATIVE | Facility: HOSPITAL | Age: 83
End: 2018-01-10

## 2018-01-10 ENCOUNTER — OFFICE VISIT (OUTPATIENT)
Dept: INTERNAL MEDICINE | Facility: CLINIC | Age: 83
End: 2018-01-10
Payer: MEDICARE

## 2018-01-10 VITALS
OXYGEN SATURATION: 96 % | TEMPERATURE: 97 F | SYSTOLIC BLOOD PRESSURE: 142 MMHG | DIASTOLIC BLOOD PRESSURE: 76 MMHG | HEART RATE: 67 BPM | HEIGHT: 62 IN

## 2018-01-10 DIAGNOSIS — F02.80 LATE ONSET ALZHEIMER'S DISEASE WITHOUT BEHAVIORAL DISTURBANCE: ICD-10-CM

## 2018-01-10 DIAGNOSIS — G30.1 LATE ONSET ALZHEIMER'S DISEASE WITHOUT BEHAVIORAL DISTURBANCE: ICD-10-CM

## 2018-01-10 DIAGNOSIS — N18.30 CHRONIC KIDNEY DISEASE (CKD), STAGE III (MODERATE): ICD-10-CM

## 2018-01-10 DIAGNOSIS — L98.9 SKIN LESION: ICD-10-CM

## 2018-01-10 DIAGNOSIS — L89.302 DECUBITUS ULCER OF BUTTOCK, STAGE 2, UNSPECIFIED LATERALITY: Primary | ICD-10-CM

## 2018-01-10 DIAGNOSIS — I10 ESSENTIAL HYPERTENSION: ICD-10-CM

## 2018-01-10 PROCEDURE — 99999 PR PBB SHADOW E&M-EST. PATIENT-LVL V: CPT | Mod: PBBFAC,,, | Performed by: INTERNAL MEDICINE

## 2018-01-10 PROCEDURE — 99213 OFFICE O/P EST LOW 20 MIN: CPT | Mod: S$GLB,,, | Performed by: INTERNAL MEDICINE

## 2018-01-10 PROCEDURE — 99499 UNLISTED E&M SERVICE: CPT | Mod: S$GLB,,, | Performed by: INTERNAL MEDICINE

## 2018-01-11 NOTE — PROGRESS NOTES
"Subjective:      Patient ID: Radha Morales is a 86 y.o. female.    Chief Complaint: Bed Sore (on buttocks)    87 yo with Patient Active Problem List:     Carotid arterial disease     CAD (coronary artery disease)     SVT (supraventricular tachycardia)     Late onset Alzheimer's disease without behavioral disturbance     Pure hypercholesterolemia     Essential hypertension     Calcification of aorta     Left ventricular diastolic dysfunction with preserved systolic function     Osteoarthritis of multiple joints     Aspiration into airway     Chronic kidney disease (CKD), stage III (moderate)     Obesity (BMI 30.0-34.9)     Anemia    Here today with her 2 daughters and son. Family reports noticing bed sores to pt buttock. Over counter topical antibiotics not improving symptoms. No d/c. Pt not complaining of pain. No fever. Has had similar issue in past resolved with home health wound care.       Review of Systems   Constitutional: Negative for chills, diaphoresis and fever.   HENT: Negative for congestion.    Respiratory: Negative for cough and shortness of breath.    Cardiovascular: Negative for chest pain.   Gastrointestinal: Negative for abdominal pain.     Objective:   BP (!) 142/76 (BP Location: Right arm, Patient Position: Sitting)   Pulse 67   Temp 96.5 °F (35.8 °C) (Tympanic)   Ht 5' 2" (1.575 m)   LMP  (LMP Unknown)   SpO2 96%     Physical Exam   Constitutional: She appears well-developed and well-nourished. No distress.   Eyes: Conjunctivae are normal.   Cardiovascular: Normal rate.    Pulmonary/Chest: Effort normal.   Abdominal: Soft. Bowel sounds are normal.   Musculoskeletal: She exhibits no edema.   Neurological: She is alert.   Skin: Skin is warm and dry.   Psychiatric: She has a normal mood and affect. Her behavior is normal.   Small irreg shaped stage 2 decubiti meghan buttock       Assessment:     1. Decubitus ulcer of buttock, stage 2, unspecified laterality    2. Late onset Alzheimer's " disease without behavioral disturbance    3. Chronic kidney disease (CKD), stage III (moderate)    4. Essential hypertension    5. Skin lesion      Plan:   Decubitus ulcer of buttock, stage 2, unspecified laterality  -     Ambulatory referral to Home Health    Late onset Alzheimer's disease without behavioral disturbance  -     Ambulatory referral to Home Health    Chronic kidney disease (CKD), stage III (moderate)  -     Ambulatory referral to Home Health    Essential hypertension  -     Ambulatory referral to Home Health    Skin lesion  -     Ambulatory referral to Dermatology    Other orders  -     Cancel: Lipid panel; Future  -     Cancel: DXA Bone Density Spine And Hip; Future            Problem List Items Addressed This Visit        Neuro    Late onset Alzheimer's disease without behavioral disturbance (Chronic)    Relevant Orders    Ambulatory referral to Home Health       Cardiac/Vascular    Essential hypertension (Chronic)    Relevant Orders    Ambulatory referral to Home Health       Renal/    Chronic kidney disease (CKD), stage III (moderate) (Chronic)    Relevant Orders    Ambulatory referral to Home Health      Other Visit Diagnoses     Decubitus ulcer of buttock, stage 2, unspecified laterality    -  Primary    Relevant Orders    Ambulatory referral to Home Health    Skin lesion        Relevant Orders    Ambulatory referral to Dermatology          Return in about 2 weeks (around 1/24/2018), or if symptoms worsen or fail to improve.

## 2018-01-19 ENCOUNTER — OFFICE VISIT (OUTPATIENT)
Dept: PODIATRY | Facility: CLINIC | Age: 83
End: 2018-01-19

## 2018-01-19 VITALS — HEIGHT: 62 IN

## 2018-01-19 DIAGNOSIS — B35.1 DERMATOPHYTOSIS OF NAIL: Primary | ICD-10-CM

## 2018-01-19 PROCEDURE — 99499 UNLISTED E&M SERVICE: CPT | Mod: S$GLB,,, | Performed by: PODIATRIST

## 2018-01-19 PROCEDURE — 17999 UNLISTD PX SKN MUC MEMB SUBQ: CPT | Mod: CSM,S$GLB,, | Performed by: PODIATRIST

## 2018-01-19 NOTE — PROGRESS NOTES
PODIATRY NOTE    CHIEF COMPLAINT   Non-medical covered nail care    HPI:    Radha Morales is a 86 y.o. female presenting to podiatry clinic with complaint of progressive thickening and discoloration of the nails. The patient relates they are unable to trim their toenails due to thickness and difficulty. Toenails are painful and aggravated in shoewear.    LOWER EXTREMITY  Dermatologic:   · Nails 1-5 bilateral thickened, elongated, dystrophic, with subungual debris      ASSESSMENT   1. Dermatophytosis     PLAN    1. With patient's permission, nails were aggressively reduced and debrided x 10 to their soft tissue attachment mechanically and with electric , removing all offending nail and debris. Patient relates relief following the procedure.   -I advised the patient that for routine nail care or callus coverage is not covered under their insurance therefore we will enroll the patient in [facility] cosmetic foot care. The patient understands that they will be fully responsible for the bill as cosmetic foot care is not a covered service for their insurance.   3. RTC PRN    Report Electronically Signed By:  Lilly Mckinney DPM   Podiatric Medicine & Surgery  [facility]  1/19/2018  10:32 AM

## 2018-01-24 ENCOUNTER — TELEPHONE (OUTPATIENT)
Dept: INTERNAL MEDICINE | Facility: CLINIC | Age: 83
End: 2018-01-24

## 2018-01-24 NOTE — TELEPHONE ENCOUNTER
S/w Katey from [facility].  States pt's wounds are getting worse.  Stated pt spends her time either on her back in bed or sitting up in the chair.  Pt is not being repositioned.  Nurse re-educated caregivers today.   requesting order to [facility] for eval/tx of wounds.  Please advise./rpr    Please give order.  SM

## 2018-01-24 NOTE — TELEPHONE ENCOUNTER
----- Message from Manju Costa sent at 1/24/2018 11:32 AM CST -----  Contact: Katey/[facility]  Katey needs order for a wound clinic evaluation, Please call her at [phone].    Thanks  td

## 2018-01-24 NOTE — TELEPHONE ENCOUNTER
Left voicemail for Katey [place]/ [facility] requesting call back for clarification of what type of order needed.

## 2018-02-14 ENCOUNTER — TELEPHONE (OUTPATIENT)
Dept: INTERNAL MEDICINE | Facility: CLINIC | Age: 83
End: 2018-02-14

## 2018-02-14 NOTE — TELEPHONE ENCOUNTER
----- Message from Tena Dejesus sent at 2/13/2018  4:03 PM CST -----  Contact: pt daughter Andra  Calling with questions and health concerns for patient and please advise asap  [phone] (home) [phone] (work)

## 2018-03-21 ENCOUNTER — PES CALL (OUTPATIENT)
Dept: ADMINISTRATIVE | Facility: CLINIC | Age: 83
End: 2018-03-21

## 2018-04-02 ENCOUNTER — TELEPHONE (OUTPATIENT)
Dept: INTERNAL MEDICINE | Facility: CLINIC | Age: 83
End: 2018-04-02

## 2018-04-02 NOTE — TELEPHONE ENCOUNTER
----- Message from Racquel German sent at 4/2/2018  7:14 AM CDT -----  Contact: Patients daughter in law  Kendra Kusum Noe called to inform of patients passing on 03/06/18, all appointments have been cancelled. Thank you

## 2024-01-11 NOTE — PHYSICIAN QUERY
PT Name: Radha Morales  MR #: [id number]     Physician Query Form - Documentation Clarification    Reviewer  [email or web address]  [phone]    This form is a permanent document in the medical record.     Query Date: March 6, 2017  By submitting this query, we are merely seeking further clarification of documentation to reflect the severity of illness of your patient. Please utilize your independent clinical judgment when addressing the question(s) below.    (The Medical record reflects the following:)      Supporting Clinical Findings Location in Medical Record      Aspiration into airway             PN  DC Summary               *Please clarify, if known, what patient aspirated on.                                                                      Doctor, Please specify diagnosis or diagnoses associated with above clinical findings.    Physician Use Only                                                                                                                               [ X ] Unable to determine            
Detail Level: Detailed
Detail Level: Zone